# Patient Record
Sex: FEMALE | Race: WHITE | NOT HISPANIC OR LATINO | Employment: OTHER | URBAN - METROPOLITAN AREA
[De-identification: names, ages, dates, MRNs, and addresses within clinical notes are randomized per-mention and may not be internally consistent; named-entity substitution may affect disease eponyms.]

---

## 2017-04-03 DIAGNOSIS — Z12.31 ENCOUNTER FOR SCREENING MAMMOGRAM FOR MALIGNANT NEOPLASM OF BREAST: ICD-10-CM

## 2017-04-13 ENCOUNTER — ALLSCRIPTS OFFICE VISIT (OUTPATIENT)
Dept: OTHER | Facility: OTHER | Age: 47
End: 2017-04-13

## 2017-04-13 ENCOUNTER — LAB REQUISITION (OUTPATIENT)
Dept: LAB | Facility: HOSPITAL | Age: 47
End: 2017-04-13
Payer: COMMERCIAL

## 2017-04-13 DIAGNOSIS — Z01.419 ENCOUNTER FOR GYNECOLOGICAL EXAMINATION WITHOUT ABNORMAL FINDING: ICD-10-CM

## 2017-04-13 PROCEDURE — G0145 SCR C/V CYTO,THINLAYER,RESCR: HCPCS | Performed by: OBSTETRICS & GYNECOLOGY

## 2017-04-21 LAB
LAB AP GYN PRIMARY INTERPRETATION: NORMAL
LAB AP LMP: NORMAL
Lab: NORMAL

## 2017-05-17 ENCOUNTER — HOSPITAL ENCOUNTER (OUTPATIENT)
Dept: MAMMOGRAPHY | Facility: MEDICAL CENTER | Age: 47
Discharge: HOME | End: 2017-05-17
Payer: COMMERCIAL

## 2017-05-17 DIAGNOSIS — Z12.31 ENCOUNTER FOR SCREENING MAMMOGRAM FOR MALIGNANT NEOPLASM OF BREAST: ICD-10-CM

## 2017-05-17 PROCEDURE — G0202 SCR MAMMO BI INCL CAD: HCPCS

## 2018-01-13 VITALS
BODY MASS INDEX: 34.41 KG/M2 | WEIGHT: 187 LBS | SYSTOLIC BLOOD PRESSURE: 108 MMHG | DIASTOLIC BLOOD PRESSURE: 82 MMHG | HEIGHT: 62 IN

## 2018-06-14 ENCOUNTER — ANNUAL EXAM (OUTPATIENT)
Dept: GYNECOLOGY | Facility: CLINIC | Age: 48
End: 2018-06-14

## 2018-06-14 VITALS
DIASTOLIC BLOOD PRESSURE: 78 MMHG | BODY MASS INDEX: 33.27 KG/M2 | WEIGHT: 180.8 LBS | SYSTOLIC BLOOD PRESSURE: 108 MMHG | HEIGHT: 62 IN

## 2018-06-14 DIAGNOSIS — Z01.419 ENCOUNTER FOR GYNECOLOGICAL EXAMINATION WITHOUT ABNORMAL FINDING: ICD-10-CM

## 2018-06-14 DIAGNOSIS — Z12.31 ENCOUNTER FOR SCREENING MAMMOGRAM FOR MALIGNANT NEOPLASM OF BREAST: Primary | ICD-10-CM

## 2018-06-14 DIAGNOSIS — N39.3 SUI (STRESS URINARY INCONTINENCE, FEMALE): ICD-10-CM

## 2018-06-14 DIAGNOSIS — Z12.4 ENCOUNTER FOR PAPANICOLAOU SMEAR FOR CERVICAL CANCER SCREENING: ICD-10-CM

## 2018-06-14 PROCEDURE — 99396 PREV VISIT EST AGE 40-64: CPT | Performed by: OBSTETRICS & GYNECOLOGY

## 2018-06-14 RX ORDER — LORAZEPAM 2 MG/1
TABLET ORAL
COMMUNITY

## 2018-06-14 RX ORDER — ASCORBIC ACID 100 MG
TABLET,CHEWABLE ORAL
COMMUNITY

## 2018-06-14 RX ORDER — MULTIVITAMIN
TABLET ORAL
COMMUNITY

## 2018-06-14 RX ORDER — CITALOPRAM 40 MG/1
TABLET ORAL
COMMUNITY

## 2018-06-14 RX ORDER — ZOLPIDEM TARTRATE 10 MG/1
10 TABLET ORAL DAILY
COMMUNITY
Start: 2018-04-25 | End: 2019-06-25

## 2018-06-14 RX ORDER — BUPROPION HYDROCHLORIDE 300 MG/1
TABLET ORAL
COMMUNITY
Start: 2017-08-15

## 2018-06-14 RX ORDER — TOPIRAMATE 25 MG/1
CAPSULE, COATED PELLETS ORAL
COMMUNITY

## 2018-06-14 NOTE — PROGRESS NOTES
Assessment/Plan:    No problem-specific Assessment & Plan notes found for this encounter  Diagnoses and all orders for this visit:    Encounter for screening mammogram for malignant neoplasm of breast  -     Mammo screening bilateral w 3d & cad; Future    Encounter for gynecological examination without abnormal finding    EMERSON (stress urinary incontinence, female) Recommended urodynamics to r/o ISD  Opts to follow for now    Other orders  -     LORazepam (ATIVAN) 2 mg tablet; Take by mouth  -     B COMPLEX VITAMINS ER PO; Take 1 tablet by mouth  -     buPROPion (WELLBUTRIN XL) 300 mg 24 hr tablet; TAKE 1 TABLET EVERY MORNING  -     Calcium Carb-Cholecalciferol (CALCIUM 1000 + D PO); one a day  -     citalopram (CELEXA) 40 mg tablet; Take by mouth  -     Cholecalciferol (D3-1000) 1000 units capsule; one a day  -     iloperidone (FANAPT) 8 MG; Take 8 mg by mouth  -     DOCOSAHEXAENOIC ACID PO; Take 1 capsule by mouth  -     Multiple Vitamin (MULTI-VITAMIN DAILY) TABS; Take by mouth  -     topiramate (TOPAMAX) 25 mg sprinkle capsule; Take by mouth    -     Ascorbic Acid (VITAMIN C) 100 MG CHEW; Chew  -     zolpidem (AMBIEN) 10 mg tablet; Take 10 mg by mouth daily        Subjective:      Patient ID: Andreia De Leon is a 52 y o  female  HPI   C/O mild recurrent EMERSON  S/P sling 2015  No dysuria, hematuria,urgency or UI     The following portions of the patient's history were reviewed and updated as appropriate: allergies, current medications, past family history, past medical history, past social history, past surgical history and problem list     Review of Systems   Constitutional: Negative  Gastrointestinal: Negative  Genitourinary: Negative  Objective:      /78 (BP Location: Left arm)   Ht 5' 1 5" (1 562 m)   Wt 82 kg (180 lb 12 8 oz)   LMP 06/01/2018 (Exact Date)   BMI 33 61 kg/m²          Physical Exam   Constitutional: She appears well-developed and well-nourished     Neck: Normal range of motion  Neck supple  No thyromegaly present  Cardiovascular: Normal rate, regular rhythm and normal heart sounds  Pulmonary/Chest: Effort normal and breath sounds normal  Right breast exhibits no inverted nipple, no mass, no nipple discharge, no skin change and no tenderness  Left breast exhibits no inverted nipple, no mass, no nipple discharge, no skin change and no tenderness  Abdominal: Soft  Bowel sounds are normal  She exhibits no distension and no mass  There is no tenderness  Hernia confirmed negative in the right inguinal area and confirmed negative in the left inguinal area  Genitourinary: There is no rash or lesion on the right labia  There is no rash or lesion on the left labia  Uterus is not deviated, not enlarged, not fixed and not tender  Cervix exhibits no motion tenderness, no discharge and no friability  Right adnexum displays no mass, no tenderness and no fullness  Left adnexum displays no mass, no tenderness and no fullness  No erythema or bleeding in the vagina  No vaginal discharge found  Genitourinary Comments: Negative urethral hypermobility   Lymphadenopathy:        Right: No inguinal adenopathy present  Left: No inguinal adenopathy present

## 2018-06-25 LAB
CLINICAL INFO: ABNORMAL
CYTO CVX: ABNORMAL
CYTOLOGY CMNT CVX/VAG CYTO-IMP: ABNORMAL
DATE PREVIOUS BX: ABNORMAL
GEN CATEG CVX/VAG CYTO-IMP: ABNORMAL
HPV E6+E7 MRNA CVX QL NAA+PROBE: NOT DETECTED
LMP START DATE: ABNORMAL
QUESTION/PROBLEM: NORMAL
SL AMB CONTAINER TYPE: NORMAL
SL AMB FINAL RESOLUTION: NORMAL
SL AMB PREV. PAP:: ABNORMAL
SL AMB REPORT STATUS: NORMAL
SPECIMEN SOURCE CVX/VAG CYTO: ABNORMAL

## 2019-06-25 ENCOUNTER — ANNUAL EXAM (OUTPATIENT)
Dept: GYNECOLOGY | Facility: CLINIC | Age: 49
End: 2019-06-25

## 2019-06-25 VITALS
HEIGHT: 63 IN | BODY MASS INDEX: 26.33 KG/M2 | DIASTOLIC BLOOD PRESSURE: 74 MMHG | WEIGHT: 148.6 LBS | SYSTOLIC BLOOD PRESSURE: 122 MMHG | HEART RATE: 65 BPM

## 2019-06-25 DIAGNOSIS — N39.3 SUI (STRESS URINARY INCONTINENCE, FEMALE): ICD-10-CM

## 2019-06-25 DIAGNOSIS — Z12.31 ENCOUNTER FOR SCREENING MAMMOGRAM FOR MALIGNANT NEOPLASM OF BREAST: Primary | ICD-10-CM

## 2019-06-25 DIAGNOSIS — Z01.419 ENCOUNTER FOR GYNECOLOGICAL EXAMINATION WITHOUT ABNORMAL FINDING: ICD-10-CM

## 2019-06-25 PROCEDURE — 99396 PREV VISIT EST AGE 40-64: CPT | Performed by: OBSTETRICS & GYNECOLOGY

## 2019-07-31 ENCOUNTER — OFFICE VISIT (OUTPATIENT)
Dept: GYNECOLOGY | Facility: CLINIC | Age: 49
End: 2019-07-31

## 2019-07-31 VITALS
BODY MASS INDEX: 26.26 KG/M2 | HEIGHT: 63 IN | SYSTOLIC BLOOD PRESSURE: 116 MMHG | WEIGHT: 148.2 LBS | DIASTOLIC BLOOD PRESSURE: 76 MMHG | HEART RATE: 65 BPM

## 2019-07-31 DIAGNOSIS — N39.41 URGENCY INCONTINENCE: Primary | ICD-10-CM

## 2019-07-31 DIAGNOSIS — N39.3 SUI (STRESS URINARY INCONTINENCE, FEMALE): ICD-10-CM

## 2019-07-31 PROCEDURE — 99213 OFFICE O/P EST LOW 20 MIN: CPT | Performed by: OBSTETRICS & GYNECOLOGY

## 2019-07-31 RX ORDER — SOLIFENACIN SUCCINATE 5 MG/1
5 TABLET, FILM COATED ORAL DAILY
Qty: 90 TABLET | Refills: 3 | Status: SHIPPED | OUTPATIENT
Start: 2019-07-31 | End: 2020-07-14 | Stop reason: SDUPTHER

## 2019-07-31 NOTE — PROGRESS NOTES
Assessment/Plan:         Diagnoses and all orders for this visit:    EMERSON (stress urinary incontinence, female); recommended urodynamics to rule out ISD  Urgency incontinence; will begin VESIcare 5 mg daily  Subjective:      Patient ID: Sweta Moss is a 50 y o  female  HPI    patient presents to the office today complaining of urinary incontinence  This occurs with exercise  She also has issues of urgency and urgency incontinence  She denies any dysuria, hematuria or change in frequency of urination or nocturia  She has had a prior mid urethral sling placed in   She denies any vaginal discharge or bleeding or abdominal pain  She has no difficulty with bowel movements  Denies any vaginal pressure or pain  The following portions of the patient's history were reviewed and updated as appropriate:   She  has a past medical history of Bipolar disorder ([facility] [facility] ), Panic attacks, and Suicide attempt ([facility] )  She There are no active problems to display for this patient  She  has a past surgical history that includes Bladder suspension; Tubal ligation;  section; and Cholecystectomy  Her family history includes Diabetes in her maternal grandmother; Heart disease in her maternal grandfather  She  reports that she has never smoked  She has never used smokeless tobacco  She reports that she drinks alcohol  She reports that she does not use drugs    Current Outpatient Medications   Medication Sig Dispense Refill    Ascorbic Acid (VITAMIN C) 100 MG CHEW Chew      buPROPion (WELLBUTRIN XL) 300 mg 24 hr tablet TAKE 1 TABLET EVERY MORNING      citalopram (CELEXA) 40 mg tablet Take by mouth      iloperidone (FANAPT) 8 MG Take 8 mg by mouth      LORazepam (ATIVAN) 2 mg tablet Take by mouth      Multiple Vitamin (MULTI-VITAMIN DAILY) TABS Take by mouth      topiramate (TOPAMAX) 25 mg sprinkle capsule Take by mouth        B COMPLEX VITAMINS ER PO Take 1 tablet by mouth      Calcium Carb-Cholecalciferol (CALCIUM 1000 + D PO) one a day      DOCOSAHEXAENOIC ACID PO Take 1 capsule by mouth       No current facility-administered medications for this visit  Current Outpatient Medications on File Prior to Visit   Medication Sig    Ascorbic Acid (VITAMIN C) 100 MG CHEW Chew    buPROPion (WELLBUTRIN XL) 300 mg 24 hr tablet TAKE 1 TABLET EVERY MORNING    citalopram (CELEXA) 40 mg tablet Take by mouth    iloperidone (FANAPT) 8 MG Take 8 mg by mouth    LORazepam (ATIVAN) 2 mg tablet Take by mouth    Multiple Vitamin (MULTI-VITAMIN DAILY) TABS Take by mouth    topiramate (TOPAMAX) 25 mg sprinkle capsule Take by mouth      B COMPLEX VITAMINS ER PO Take 1 tablet by mouth    Calcium Carb-Cholecalciferol (CALCIUM 1000 + D PO) one a day    DOCOSAHEXAENOIC ACID PO Take 1 capsule by mouth     No current facility-administered medications on file prior to visit  She has No Known Allergies       Review of Systems   Gastrointestinal: Negative  Genitourinary:        See HPI         Objective:      /76 (BP Location: Left arm)   Pulse 65   Ht 5' 2 5" (1 588 m)   Wt 67 2 kg (148 lb 3 2 oz)   BMI 26 67 kg/m²          Physical Exam   Constitutional: She appears well-nourished  Abdominal: Soft  Bowel sounds are normal  She exhibits no distension and no mass  There is no tenderness  There is no rebound and no guarding  Genitourinary:   Genitourinary Comments: Vulva appears normal    Vagina:  Normal     Uterus is anteverted normal size and contour freely mobile nontender  There are no palpable adnexal masses or tenderness  No urethral hypermobility

## 2019-11-13 DIAGNOSIS — Z12.31 ENCOUNTER FOR SCREENING MAMMOGRAM FOR MALIGNANT NEOPLASM OF BREAST: ICD-10-CM

## 2020-06-30 ENCOUNTER — TELEPHONE (OUTPATIENT)
Dept: OTHER | Facility: OTHER | Age: 50
End: 2020-06-30

## 2020-07-14 ENCOUNTER — ANNUAL EXAM (OUTPATIENT)
Dept: GYNECOLOGY | Facility: CLINIC | Age: 50
End: 2020-07-14

## 2020-07-14 VITALS
HEIGHT: 62 IN | DIASTOLIC BLOOD PRESSURE: 76 MMHG | SYSTOLIC BLOOD PRESSURE: 116 MMHG | HEART RATE: 65 BPM | WEIGHT: 169 LBS | BODY MASS INDEX: 31.1 KG/M2

## 2020-07-14 DIAGNOSIS — N93.9 ABNORMAL UTERINE BLEEDING (AUB): Primary | ICD-10-CM

## 2020-07-14 DIAGNOSIS — Z01.419 ENCOUNTER FOR GYNECOLOGICAL EXAMINATION WITH PAPANICOLAOU SMEAR OF CERVIX: ICD-10-CM

## 2020-07-14 DIAGNOSIS — Z12.31 ENCOUNTER FOR SCREENING MAMMOGRAM FOR MALIGNANT NEOPLASM OF BREAST: ICD-10-CM

## 2020-07-14 PROCEDURE — G0145 SCR C/V CYTO,THINLAYER,RESCR: HCPCS | Performed by: OBSTETRICS & GYNECOLOGY

## 2020-07-14 PROCEDURE — 99396 PREV VISIT EST AGE 40-64: CPT | Performed by: OBSTETRICS & GYNECOLOGY

## 2020-07-14 RX ORDER — MEDROXYPROGESTERONE ACETATE 10 MG/1
10 TABLET ORAL DAILY
Qty: 10 TABLET | Refills: 0 | Status: SHIPPED | OUTPATIENT
Start: 2020-07-14 | End: 2021-03-02

## 2020-07-21 LAB
LAB AP GYN PRIMARY INTERPRETATION: NORMAL
Lab: NORMAL

## 2020-08-05 ENCOUNTER — ULTRASOUND (OUTPATIENT)
Dept: GYNECOLOGY | Facility: CLINIC | Age: 50
End: 2020-08-05

## 2020-08-05 ENCOUNTER — OFFICE VISIT (OUTPATIENT)
Dept: GYNECOLOGY | Facility: CLINIC | Age: 50
End: 2020-08-05

## 2020-08-05 DIAGNOSIS — N93.9 ABNORMAL UTERINE BLEEDING (AUB): Primary | ICD-10-CM

## 2020-08-05 DIAGNOSIS — N80.0 ADENOMYOSIS: ICD-10-CM

## 2020-08-05 DIAGNOSIS — N84.0 ENDOMETRIAL POLYP: ICD-10-CM

## 2020-08-05 PROCEDURE — 58100 BIOPSY OF UTERUS LINING: CPT | Performed by: OBSTETRICS & GYNECOLOGY

## 2020-08-05 PROCEDURE — 76831 ECHO EXAM UTERUS: CPT | Performed by: OBSTETRICS & GYNECOLOGY

## 2020-08-05 PROCEDURE — 88305 TISSUE EXAM BY PATHOLOGIST: CPT | Performed by: PATHOLOGY

## 2020-08-05 PROCEDURE — 99212 OFFICE O/P EST SF 10 MIN: CPT | Performed by: OBSTETRICS & GYNECOLOGY

## 2020-08-05 PROCEDURE — 58340 CATHETER FOR HYSTEROGRAPHY: CPT | Performed by: OBSTETRICS & GYNECOLOGY

## 2020-08-05 PROCEDURE — 76830 TRANSVAGINAL US NON-OB: CPT | Performed by: OBSTETRICS & GYNECOLOGY

## 2020-08-05 NOTE — PROGRESS NOTES
AMB US Pelvic Non OB    Date/Time: 8/5/2020 8:15 AM  Performed by: Michael Blum  Authorized by: Carly Matt DO     Procedure details:     Technique:  Transvaginal US, Non-OB    Position: lithotomy exam    Uterine findings:     Length (cm): 7 48    Height (cm):  5 07    Width (cm):  5 71    Endometrial stripe: identified      Endometrium thickness (mm):  11 6  Left ovary findings:     Left ovary:  Visualized    Length (cm): 3 23    Height (cm): 1 77    Width (cm): 1 71  Right ovary findings:     Right ovary:  Visualized    Length (cm): 2 18    Height (cm): 2    Width (cm): 2 27  Other findings:     Free pelvic fluid: identified    Post-Procedure Details:     Impression:  Multi-position uterus is inhomogeneous throughout without fibroids suggestive of adenomyosis  The bilateral ovaries appear within normal limits  The right ovary contains a 1 8GZ follicle  Small amount of free fluid is noted within the cul de sac  Tolerance: Tolerated well, no immediate complications    Complications: no complications    Additional Procedure Comments:      AutoAlert P5 transvaginal transducer E8C with Serial Number [id number] was used during procedure and subsequently cleaned with high level disinfection utilizing the [employer]       Ultrasound performed at:     [facility] for [facility] [address]  [address] [facility]  [address], [address] [facility]  Phone: [phone]  Fax:  [phone]    Sonohysterogram    Date/Time: 8/5/2020 8:16 AM  Performed by: Carly Matt DO  Authorized by: Carly Matt DO     Consent:     Consent obtained:  Verbal and written    Consent given by:  Patient    Patient questions answered: yes      Patient agrees, verbalizes understanding, and wants to proceed: yes      Instructions and paperwork completed: yes    Pre-procedure:     Pre-procedure timeout performed: yes      Prepped with: Betadine    Procedure:     Cervix cleaned and prepped: yes Catheter inserted: yes      Uterine cavity distended with saline: yes    Post-procedure:     Patient observed: yes      Post procedure instructions given to patient: yes      Patient tolerated procedure well with no complications: yes    Comments:      Sonohysterogram demonstrates a polyp within the endometrium     Endometrial biopsy    Date/Time: 8/5/2020 8:16 AM  Performed by: Vandana Jovel DO  Authorized by: Vandana Jovel DO     Consent:     Consent obtained:  Verbal and written    Consent given by:  Patient    Patient questions answered: yes      Patient agrees, verbalizes understanding, and wants to proceed: yes      Instructions and paperwork completed: yes    Indication:     Indications: Post-menopausal bleeding    Pre-procedure:     Pre-procedure timeout performed: yes    Procedure:     Procedure: endometrial biopsy with Pipelle      A bivalve speculum was placed in the vagina: yes      Cervix cleaned and prepped: yes      Specimen collected: specimen collected and sent to pathology      Patient tolerated procedure well with no complications: yes

## 2020-08-05 NOTE — PROGRESS NOTES
Patient presents to the office for transvaginal scan possible SIS possible endometrial biopsy secondary to abnormal uterine bleeding  Procedure Orders    1  Endometrial biopsy [[id number]] ordered by Chastity Anton    2  Sonohysterogram [[id number]] ordered by Chastity Anton    3  AMB US Pelvic Non OB [[id number]] ordered by Chastity Anton    Post-procedure Diagnoses    1  Abnormal uterine bleeding (AUB) [N93 9]       AMB US Pelvic Non OB   Date/Time: 8/5/2020 8:15 AM  Performed by: Chastity Anton  Authorized by: Carlyle Arias DO      Procedure details:     Technique:  Transvaginal US, Non-OB    Position: lithotomy exam    Uterine findings:     Length (cm): 7 48    Height (cm):  5 07    Width (cm):  5 71    Endometrial stripe: identified      Endometrium thickness (mm):  11 6  Left ovary findings:     Left ovary:  Visualized    Length (cm): 3 23    Height (cm): 1 77    Width (cm): 1 71  Right ovary findings:     Right ovary:  Visualized    Length (cm): 2 18    Height (cm): 2    Width (cm): 2 27  Other findings:     Free pelvic fluid: identified    Post-Procedure Details:     Impression:  Multi-position uterus is inhomogeneous throughout without fibroids suggestive of adenomyosis  The bilateral ovaries appear within normal limits  The right ovary contains a 5 4QR follicle  Small amount of free fluid is noted within the cul de sac  Tolerance:   Tolerated well, no immediate complications    Complications: no complications    Additional Procedure Comments:      [employer] P5 transvaginal transducer E8C with Serial Number [id number] was used during procedure and subsequently cleaned with high level disinfection utilizing the [employer]       Ultrasound performed at:   [address], [address] [facility]  Phone: [phone]  Fax:  [phone]     Sonohysterogram   Date/Time: 8/5/2020 8:16 AM  Performed by: Kassandra Dee DO Huber  Authorized by: Cintia Fournier DO      Consent:     Consent obtained:  Verbal and written    Consent given by:  Patient    Patient questions answered: yes      Patient agrees, verbalizes understanding, and wants to proceed: yes      Instructions and paperwork completed: yes    Pre-procedure:     Pre-procedure timeout performed: yes      Prepped with: Betadine    Procedure:     Cervix cleaned and prepped: yes      Catheter inserted: yes      Uterine cavity distended with saline: yes    Post-procedure:     Patient observed: yes      Post procedure instructions given to patient: yes      Patient tolerated procedure well with no complications: yes    Comments:      Sonohysterogram demonstrates a polyp within the endometrium  Endometrial biopsy   Date/Time: 8/5/2020 8:16 AM  Performed by: Cintia Fournier DO  Authorized by: Cintia Fournier DO      Consent:     Consent obtained:  Verbal and written    Consent given by:  Patient    Patient questions answered: yes      Patient agrees, verbalizes understanding, and wants to proceed: yes      Instructions and paperwork completed: yes    Indication:     Indications: Post-menopausal bleeding    Pre-procedure:     Pre-procedure timeout performed: yes    Procedure:     Procedure: endometrial biopsy with Pipelle      A bivalve speculum was placed in the vagina: yes      Cervix cleaned and prepped: yes      Specimen collected: specimen collected and sent to pathology      Patient tolerated procedure well with no complications: yes          Impression:  Abnormal uterine bleeding/adenomyosis/endometrial polyp  Plan: Will return to the office for hysteroscopy D&C polypectomy  Procedure along with risks were discussed  Also discussed adenomyosis and treatment options  If she continues to have menorrhagia after removal of the polyp would consider medical management for adenomyosis

## 2020-08-12 ENCOUNTER — DOCUMENTATION (OUTPATIENT)
Dept: GYNECOLOGY | Facility: CLINIC | Age: 50
End: 2020-08-12

## 2020-08-12 NOTE — PROGRESS NOTES
PT   HAD APPT   FOR OFFICE SURGERY ON 9/4  INFORMED ME SHE IS ATTENDING A WED[place]ING IN [place],THAT IS A HOT STATE WILL NEED TO QUARANTINE FOR 14 DAYS  RESCHEDULED HER APPT TO 10/2

## 2020-10-15 ENCOUNTER — TELEPHONE (OUTPATIENT)
Dept: GYNECOLOGY | Facility: CLINIC | Age: 50
End: 2020-10-15

## 2020-12-10 ENCOUNTER — TELEPHONE (OUTPATIENT)
Dept: GYNECOLOGY | Facility: CLINIC | Age: 50
End: 2020-12-10

## 2021-01-08 ENCOUNTER — PROCEDURE VISIT (OUTPATIENT)
Dept: GYNECOLOGY | Facility: CLINIC | Age: 51
End: 2021-01-08
Payer: COMMERCIAL

## 2021-01-08 VITALS — SYSTOLIC BLOOD PRESSURE: 134 MMHG | DIASTOLIC BLOOD PRESSURE: 95 MMHG | HEART RATE: 104 BPM

## 2021-01-08 DIAGNOSIS — N84.0 ENDOMETRIAL POLYP: ICD-10-CM

## 2021-01-08 DIAGNOSIS — N93.9 ABNORMAL UTERINE BLEEDING (AUB): Primary | ICD-10-CM

## 2021-01-08 PROCEDURE — 88305 TISSUE EXAM BY PATHOLOGIST: CPT | Performed by: PATHOLOGY

## 2021-01-08 PROCEDURE — 88342 IMHCHEM/IMCYTCHM 1ST ANTB: CPT | Performed by: PATHOLOGY

## 2021-01-08 PROCEDURE — 58558 HYSTEROSCOPY BIOPSY: CPT | Performed by: OBSTETRICS & GYNECOLOGY

## 2021-03-01 ENCOUNTER — DOCUMENTATION (OUTPATIENT)
Dept: GYNECOLOGY | Facility: CLINIC | Age: 51
End: 2021-03-01

## 2021-03-01 NOTE — PROGRESS NOTES
Pt called she had period during her D&C polypectomy on 1/8  Got a period late in Feb 2/17 it was normal  Now she is bleeding again 3/1, 2 weeks after last period  Please advise

## 2021-03-02 DIAGNOSIS — N93.9 ABNORMAL UTERINE BLEEDING (AUB): Primary | ICD-10-CM

## 2021-03-02 RX ORDER — MEDROXYPROGESTERONE ACETATE 10 MG/1
10 TABLET ORAL DAILY
Qty: 10 TABLET | Refills: 0 | Status: SHIPPED | OUTPATIENT
Start: 2021-03-02 | End: 2021-03-12

## 2021-03-03 ENCOUNTER — DOCUMENTATION (OUTPATIENT)
Dept: GYNECOLOGY | Facility: CLINIC | Age: 51
End: 2021-03-03

## 2021-03-03 NOTE — PROGRESS NOTES
Called pt , Dr Rina Álvarez called in Provera for her to take for 10 days to shed the lining for hger    Pt will call if no better

## 2021-07-07 ENCOUNTER — TELEPHONE (OUTPATIENT)
Dept: GYNECOLOGY | Facility: CLINIC | Age: 51
End: 2021-07-07

## 2021-07-07 NOTE — TELEPHONE ENCOUNTER
Patient called looking for date of bladder sling surgery  I could not find it in her chart  Please call her with date

## 2022-03-15 ENCOUNTER — OFFICE VISIT (OUTPATIENT)
Dept: GYNECOLOGY | Facility: CLINIC | Age: 52
End: 2022-03-15
Payer: COMMERCIAL

## 2022-03-15 VITALS
SYSTOLIC BLOOD PRESSURE: 104 MMHG | DIASTOLIC BLOOD PRESSURE: 64 MMHG | WEIGHT: 176.8 LBS | HEART RATE: 77 BPM | BODY MASS INDEX: 32.87 KG/M2

## 2022-03-15 DIAGNOSIS — R10.31 RIGHT LOWER QUADRANT ABDOMINAL PAIN: Primary | ICD-10-CM

## 2022-03-15 PROCEDURE — 99213 OFFICE O/P EST LOW 20 MIN: CPT | Performed by: OBSTETRICS & GYNECOLOGY

## 2022-03-15 RX ORDER — NAPROXEN SODIUM 550 MG/1
550 TABLET ORAL 2 TIMES DAILY WITH MEALS
Qty: 30 TABLET | Refills: 0 | Status: SHIPPED | OUTPATIENT
Start: 2022-03-15 | End: 2022-03-30

## 2022-03-15 NOTE — PROGRESS NOTES
Assessment/Plan:         Diagnoses and all orders for this visit:    Right lower quadrant abdominal pain; possible ruptured ovarian cyst   Return to the office for transvaginal scan  Subjective:      Patient ID: Alek Neff is a 46 y o  female  HPI  patient presents to the office complaining of right lower quadrant pain she had abrupt onset of pain yesterday in the right lower quadrant  The pain has persisted since then  She has no associated nausea, vomiting, diarrhea, fever or constipation  Denies any dysuria, hematuria urgency or urinary incontinence  She is due for menses any day  She is status post hysteroscopy D&C polypectomy in 2021  Since then her menses have been regular with normal flow  She describes the pain as a 7/10 and persistent  No relief with Tylenol she did have coitus this morning and that aggravated the discomfort  The following portions of the patient's history were reviewed and updated as appropriate:   She  has a past medical history of Bipolar disorder ([facility] [address] ), Panic attacks, and [facility] )  She There are no problems to display for this patient  She  has a past surgical history that includes Bladder suspension; Tubal ligation;  section; Cholecystectomy; and DILATION AND CURETTAGE OF UTERUS WITH HYSTEROSOCPY (N/A, 2021)  Her family history includes Diabetes in her maternal grandmother; Heart disease in her maternal grandfather  She  reports that she has never smoked  She has never used smokeless tobacco  She reports previous alcohol use  She reports that she does not use drugs    Current Outpatient Medications   Medication Sig Dispense Refill    Ascorbic Acid (VITAMIN C) 100 MG CHEW Chew      B COMPLEX VITAMINS ER PO Take 1 tablet by mouth      buPROPion (WELLBUTRIN XL) 300 mg 24 hr tablet TAKE 1 TABLET EVERY MORNING      Calcium Carb-Cholecalciferol (CALCIUM 1000 + D PO) one a day      citalopram (CELEXA) 40 mg tablet Take by mouth      iloperidone (FANAPT) 8 MG Take 8 mg by mouth      LORazepam (ATIVAN) 2 mg tablet Take by mouth      Multiple Vitamin (MULTI-VITAMIN DAILY) TABS Take by mouth      topiramate (TOPAMAX) 25 mg sprinkle capsule Take by mouth        DOCOSAHEXAENOIC ACID PO Take 1 capsule by mouth (Patient not taking: Reported on 3/15/2022 )      medroxyPROGESTERone (PROVERA) 10 mg tablet Take 1 tablet (10 mg total) by mouth daily for 10 days (Patient not taking: Reported on 3/15/2022 ) 10 tablet 0    Sodium Sulfate-Mag Sulfate-KCl (Sutab) 9640-906-504 MG TABS Take 1 kit by mouth see administration instructions Instructions given office visit (Patient not taking: Reported on 3/15/2022 ) 24 tablet 0     No current facility-administered medications for this visit  Current Outpatient Medications on File Prior to Visit   Medication Sig    Ascorbic Acid (VITAMIN C) 100 MG CHEW Chew    B COMPLEX VITAMINS ER PO Take 1 tablet by mouth    buPROPion (WELLBUTRIN XL) 300 mg 24 hr tablet TAKE 1 TABLET EVERY MORNING    Calcium Carb-Cholecalciferol (CALCIUM 1000 + D PO) one a day    citalopram (CELEXA) 40 mg tablet Take by mouth    iloperidone (FANAPT) 8 MG Take 8 mg by mouth    LORazepam (ATIVAN) 2 mg tablet Take by mouth    Multiple Vitamin (MULTI-VITAMIN DAILY) TABS Take by mouth    topiramate (TOPAMAX) 25 mg sprinkle capsule Take by mouth      DOCOSAHEXAENOIC ACID PO Take 1 capsule by mouth (Patient not taking: Reported on 3/15/2022 )    medroxyPROGESTERone (PROVERA) 10 mg tablet Take 1 tablet (10 mg total) by mouth daily for 10 days (Patient not taking: Reported on 3/15/2022 )    Sodium Sulfate-Mag Sulfate-KCl (Sutab) 6841-980-687 MG TABS Take 1 kit by mouth see administration instructions Instructions given office visit (Patient not taking: Reported on 3/15/2022 )     No current facility-administered medications on file prior to visit  She has No Known Allergies       Review of Systems Gastrointestinal: Positive for abdominal pain  Negative for abdominal distention, blood in stool, constipation, diarrhea, nausea and vomiting  Genitourinary: Negative  Objective:      /64   Pulse 77   Wt 80 2 kg (176 lb 12 8 oz)   LMP 02/08/2022   BMI 32 87 kg/m²          Physical Exam  Vitals reviewed  Abdominal:      General: There is no distension  Palpations: Abdomen is soft  There is no mass  Tenderness: There is abdominal tenderness (Slight tenderness)  There is no guarding or rebound  Hernia: No hernia is present  There is no hernia in the left inguinal area or right inguinal area  Genitourinary:     General: Normal vulva  Labia:         Right: No rash, tenderness or lesion  Left: No rash, tenderness or lesion  Vagina: Normal       Cervix: Normal       Uterus: Normal        Adnexa:         Right: Tenderness and fullness present  No mass  Left: No mass, tenderness or fullness  Lymphadenopathy:      Lower Body: No right inguinal adenopathy  No left inguinal adenopathy  Neurological:      Mental Status: She is alert

## 2022-03-16 ENCOUNTER — OFFICE VISIT (OUTPATIENT)
Dept: GYNECOLOGY | Facility: CLINIC | Age: 52
End: 2022-03-16
Payer: COMMERCIAL

## 2022-03-16 ENCOUNTER — ULTRASOUND (OUTPATIENT)
Dept: GYNECOLOGY | Facility: CLINIC | Age: 52
End: 2022-03-16
Payer: COMMERCIAL

## 2022-03-16 DIAGNOSIS — R10.31 RLQ ABDOMINAL PAIN: Primary | ICD-10-CM

## 2022-03-16 DIAGNOSIS — R10.31 RIGHT LOWER QUADRANT ABDOMINAL PAIN: Primary | ICD-10-CM

## 2022-03-16 PROCEDURE — 99212 OFFICE O/P EST SF 10 MIN: CPT | Performed by: OBSTETRICS & GYNECOLOGY

## 2022-03-16 PROCEDURE — 76830 TRANSVAGINAL US NON-OB: CPT | Performed by: OBSTETRICS & GYNECOLOGY

## 2022-03-16 NOTE — PROGRESS NOTES
AMB US Pelvic Non OB    Date/Time: 3/16/2022 6:56 AM  Performed by: Seth Fraser  Authorized by: Ally Meza DO   Universal Protocol:  Patient identity confirmed: verbally with patient      Procedure details:     Technique:  Transvaginal US, Non-OB    Position: lithotomy exam    Uterine findings:     Length (cm): 6 83    Height (cm):  4 19    Width (cm):  5 8    Endometrial stripe: identified      Endometrium thickness (mm):  4 5  Left ovary findings:     Left ovary:  Visualized    Length (cm): 2 48    Height (cm): 1 17    Width (cm): 1 73  Right ovary findings:     Right ovary:  Visualized    Length (cm): 2 48    Height (cm): 1 37    Width (cm): 1 46  Other findings:     Free pelvic fluid: not identified      Free peritoneal fluid: not identified    Post-Procedure Details:     Impression:  Anteverted uterus is inhomogeneous throughout without fibroids  Otherwise, the uterus and bilateral ovaries appear within normal limits  No free fluid  Tolerance: Tolerated well, no immediate complications    Complications: no complications    Additional Procedure Comments:      [employer] P5 transvaginal transducer E8C with Serial Number [id number] was used during procedure and subsequently cleaned with high level disinfection utilizing the [employer]       Ultrasound performed at:     [facility] for [address]  [address], [address]   Phone: [phone]  Fax:  [phone]

## 2022-03-16 NOTE — PROGRESS NOTES
Note from yesterday:   patient presents to the office complaining of right lower quadrant pain she had abrupt onset of pain yesterday in the right lower quadrant  The pain has persisted since then  She has no associated nausea, vomiting, diarrhea, fever or constipation  Denies any dysuria, hematuria urgency or urinary incontinence  She is due for menses any day  She is status post hysteroscopy D&C polypectomy in January of 2021  Since then her menses have been regular with normal flow  She describes the pain as a 7/10 and persistent  No relief with Tylenol she did have coitus this morning and that aggravated the discomfort  Advised to return to the office for transvaginal scan  Since seen yesterday the pain has subsided with naproxen  TVS:       Date/Time: 3/16/2022 6:56 AM  Performed by: Chio Rivas  Authorized by: Rober Perez DO   Universal Protocol:  Patient identity confirmed: verbally with patient        Procedure details:     Technique:  Transvaginal US, Non-OB    Position: lithotomy exam    Uterine findings:     Length (cm): 6 83    Height (cm):  4 19    Width (cm):  5 8    Endometrial stripe: identified      Endometrium thickness (mm):  4 5  Left ovary findings:     Left ovary:  Visualized    Length (cm): 2 48    Height (cm): 1 17    Width (cm): 1 73  Right ovary findings:     Right ovary:  Visualized    Length (cm): 2 48    Height (cm): 1 37    Width (cm): 1 46  Other findings:     Free pelvic fluid: not identified      Free peritoneal fluid: not identified    Post-Procedure Details:     Impression:  Anteverted uterus is inhomogeneous throughout without fibroids  Otherwise, the uterus and bilateral ovaries appear within normal limits  No free fluid  Tolerance: Tolerated well, no immediate       Impression:  Right lower quadrant abdominal pain/resolved    Plan: Reviewed ultrasound findings with patient    Should pain reoccur she will return to the office

## 2023-05-22 ENCOUNTER — EVALUATION (OUTPATIENT)
Dept: PHYSICAL THERAPY | Facility: MEDICAL CENTER | Age: 53
End: 2023-05-22

## 2023-05-22 DIAGNOSIS — M25.562 LEFT KNEE PAIN, UNSPECIFIED CHRONICITY: Primary | ICD-10-CM

## 2023-05-22 NOTE — PROGRESS NOTES
PT Evaluation     Today's date: 2023  Patient name: Sushma Pteit  : 1970  MRN: [id number]  Referring provider: Jomar Chanel*  Dx:   Encounter Diagnosis     ICD-10-CM    1  Left knee pain, unspecified chronicity  M25 562                      Assessment  Assessment details: Pt is a pleasant 47 yo female presenting to physical therapy with L knee pain  Pt would benefit from skilled PT to address current impairments and return pt to pre-morbid function  Understanding of Dx/Px/POC: good   Prognosis: good    Goals  Impairment Goals  - Pt I with initial HEP in 1-2 visits  - Increase strength to 5/5 in all affected areas in 4-6 weeks    Functional Goals  - Increase FOTO to at least 74 in 6-8 weeks  - Patient will be independent with comprehensive HEP in 6-8 weeks  - Squatting is improved to prior level of function in 6-8 weeks    Plan  Patient would benefit from: skilled physical therapy  Other planned modality interventions: Modalities prn   Planned therapy interventions: manual therapy, neuromuscular re-education, patient education, strengthening, stretching, therapeutic activities, therapeutic exercise and balance  Frequency: 2x week  Duration in weeks: 8  Treatment plan discussed with: patient        Subjective Evaluation    History of Present Illness  Mechanism of injury: Pt reports that she was doing a step up exercise on a folding chair and she missteped on the way down  When her L foot hit the ground she fell to the ground and felt a pop in her L knee  The pop occurred in the back of the knee  She was able to put weight on it, but it hurt  This occurred in March  She has reconverted from the injury with everything except squatting  Pt is pain free unless she squats down for daily activity and with exercise      Pain  Current pain ratin  At best pain ratin  At worst pain rating: 3    Social Support    Working: Home health aide   Exercise history: walking, circuit "training       Diagnostic Tests  X-ray: normal  Abnormal MRI: pending  Treatments  No previous or current treatments  Patient Goals  Patient goals for therapy: decreased pain  Patient goal: restore ability squat, exercise better        Objective     Observations     Additional Observation Details  Gait is unremarkable    Balance is decreased B, worse on the L    - LLD     Functional squat: Pt with good depth, but shifts to the right at end range due to pain            Neurological Testing     Sensation     Knee   Left Knee   Intact: light touch    Right Knee   Intact: light touch     Active Range of Motion   Left Knee   Normal active range of motion    Right Knee   Normal active range of motion    Passive Range of Motion   Left Knee   Normal passive range of motion    Right Knee   Normal passive range of motion    Additional Passive Range of Motion Details  Pt with no pain with MO in flexion on the L     Mobility   Patellar Mobility:   Left Knee   WFL: medial, lateral, superior and inferior       Right Knee   WFL: medial, lateral, superior and inferior    Strength/Myotome Testing     Left Hip   Planes of Motion   Flexion: 4  Extension: 4  Abduction: 4  Adduction: 4    Isolated Muscles   Gluteus saad: 4    Right Hip   Planes of Motion   Flexion: 4  Extension: 4  Abduction: 4  Adduction: 4    Isolated Muscles   Gluteus maximums: 4    Left Knee   Prone flexion: 3+  Extension: 5    Right Knee   Prone flexion: 5  Extension: 5    General Comments:      Knee Comments  + L quad tightness             Precautions: None      Manuals 5/22            Knee flexion mobs  HK                                                   Ther Ex 5/22            Bike NV            Quad str 3x30\"            Standing HS curls 3x15            Bridging x30                                                                                                                                                                                                    "

## 2023-06-12 ENCOUNTER — APPOINTMENT (OUTPATIENT)
Dept: PHYSICAL THERAPY | Facility: MEDICAL CENTER | Age: 53
End: 2023-06-12
Payer: COMMERCIAL

## 2023-06-22 ENCOUNTER — OFFICE VISIT (OUTPATIENT)
Dept: PHYSICAL THERAPY | Facility: MEDICAL CENTER | Age: 53
End: 2023-06-22
Payer: COMMERCIAL

## 2023-06-22 DIAGNOSIS — M54.16 LUMBAR RADICULOPATHY: ICD-10-CM

## 2023-06-22 DIAGNOSIS — M54.12 CERVICAL RADICULOPATHY: Primary | ICD-10-CM

## 2023-06-22 PROCEDURE — 97110 THERAPEUTIC EXERCISES: CPT

## 2023-06-22 PROCEDURE — 97140 MANUAL THERAPY 1/> REGIONS: CPT

## 2023-06-22 NOTE — PROGRESS NOTES
"Daily Note     Today's date: 2023  Patient name: Csasie Vera  : 1970  MRN: [id number]  Referring provider: Mariel Mann*  Dx:   Encounter Diagnosis     ICD-10-CM    1  Cervical radiculopathy  M54 12       2  Lumbar radiculopathy  M54 16                      Subjective: Pt reports that she's been compliant with her hep and her knee has been feeling much better  Objective: See treatment diary below      Assessment: Tolerated treatment well  Patient demonstrated fatigue post treatment and exhibited good technique with therapeutic exercises  Pt has responded well to current tx plan and has been performing her hep on a daily basis  Pt's strength and activity level have improved since beginning her hep and is therefore dc'd from PT  Plan: Pt dc'd to hep after todays visit        Precautions: None      Manuals            Knee flexion mobs  HK PROM  KO                                                  Ther Ex            Bike NV 10 min           Quad str 3x30\" 3x30\"           Standing HS curls 3x15 3x15           Bridging x30 x30           Add squeeze  5\"  3x10           T-band abd  Blk  3x10           Clamshells  3x10           Reverse CS  3x10                                                                                                                                                                                                                                             "

## 2023-08-16 PROBLEM — Z86.010 HX OF ADENOMATOUS POLYP OF COLON: Status: ACTIVE | Noted: 2021-03-17

## 2023-08-16 PROBLEM — Z86.0101 HX OF ADENOMATOUS POLYP OF COLON: Status: ACTIVE | Noted: 2021-03-17

## 2023-08-16 PROBLEM — Z98.890 HISTORY OF ESOPHAGOGASTRODUODENOSCOPY (EGD): Status: ACTIVE | Noted: 2023-08-01

## 2023-09-14 ENCOUNTER — EVALUATION (OUTPATIENT)
Dept: PHYSICAL THERAPY | Facility: MEDICAL CENTER | Age: 53
End: 2023-09-14
Payer: COMMERCIAL

## 2023-09-14 DIAGNOSIS — M25.562 ACUTE PAIN OF LEFT KNEE: Primary | ICD-10-CM

## 2023-09-14 PROCEDURE — 97110 THERAPEUTIC EXERCISES: CPT | Performed by: PHYSICAL THERAPIST

## 2023-09-14 PROCEDURE — 97161 PT EVAL LOW COMPLEX 20 MIN: CPT | Performed by: PHYSICAL THERAPIST

## 2023-09-14 NOTE — PROGRESS NOTES
PT Evaluation     Today's date: 2023  Patient name: Apple Sharma  : 1970  MRN: [id number]  Referring provider: Symone Stovall*  Dx:   Encounter Diagnosis     ICD-10-CM    1. Acute pain of left knee  M25.562                      Assessment  Assessment details: Apple Sharma is a pleasant 48 y.o. female who presents with acute L knee pain since injury sustained when falling on 23. MRI of her knee showed a L MCL sprain and patellar cartilage tear. She also has a known L ACL tear from May 2023 that was treated conservatively. No further referral is necessary based upon examination results. Primary movement impairment is L knee hypomobility as a result of injury sustained 3 weeks ago (DOI: 23), which limits her ability to ambulate and negotiate stairs. Patient also presents with L quadriceps and HS weakness, which also limits her standing tolerance. In addition, proximal strength deficits in her L hip girdle contribute to compensatory stress on her L knee when squatting. Patient was educated in an illustrated HEP for knee mobility and quad strength and was able to complete exercises without pain. Patient would benefit from skilled PT services to address the listed impairments to facilitate a return to PLOF. Thank you for the referral.   Impairments: abnormal muscle firing, abnormal muscle tone, abnormal or restricted ROM, abnormal movement, activity intolerance, impaired physical strength, lacks appropriate home exercise program and pain with function  Functional limitations: ambulation, stair negotiation, squatting  Symptom irritability: lowBarriers to therapy: none  Understanding of Dx/Px/POC: good   Prognosis: good  Prognosis details: Positive prognostic factors include positive attitude towards recovery. No negative prognostic factors.     Goals  STG:  Patient will be independent with home exercise program.   Patient will be able to perform a proper QS on her L LE to improve EXT ROM for standing. LTG:  Patient will increase L knee EXT and FLX AROM to be comparable to the contralateral side to improve her quality of gait mechanics. Patient will increase L quad/HS strength to at least 4+/5 to be able to ambulate longer distances. Patient will increase L hip strength by at least 1-2 grades via MMT to reduce stress on L knee during ADL. Patient will be able to ambulate longer distances. Patient will be able to manage symptoms independently. Plan  Plan details: Prognosis is above given PT services 2x/week tapering to 1x/week over the next 6 weeks and given HEP adherence. Patient would benefit from: skilled physical therapy  Referral necessary: No  Planned modality interventions: cryotherapy and thermotherapy: hydrocollator packs  Planned therapy interventions: activity modification, joint mobilization, kinesiology taping, manual therapy, IASTM, massage, Tellez taping, motor coordination training, neuromuscular re-education, patient education, strengthening, stretching, therapeutic activities, therapeutic exercise, body mechanics training, flexibility, functional ROM exercises, balance, graded activity, graded exercise, gait training and home exercise program  Frequency: 2x week  Duration in weeks: 6  Treatment plan discussed with: patient        Subjective Evaluation    History of Present Illness  Date of onset: 8/24/2023  Mechanism of injury: trauma  Mechanism of injury: This is a 48 y.o. female presenting with L knee pain since a fall down her stairs on 8/24/23. She received an MRI that showed an MCL sprain and patellar cartilage tear. She also has a previous history of an ACL tear in her L knee from May 2023 when she fell off of a step when exercising. She has not received surgery on her knee to address her ACL tear, but she does have a history of L mensicus surgery from 2004.  She has the most difficulty with standing, walking, going up and down the stairs, and squatting. She is currently wearing a hinged brace. Not a recurrent problem   Quality of life: good    Patient Goals  Patient goals for therapy: decreased pain, increased motion, increased strength and independence with ADLs/IADLs  Patient goal: to be able to stand, walk, climb stairs, squat  Pain  Current pain ratin  At best pain ratin  At worst pain ratin  Location: L medial knee/anterior knee, occasional radiation into L great toe  Quality: radiating  Relieving factors: ice  Aggravating factors: standing, walking and stair climbing (squatting)  Progression: improved    Social Support    Employment status: working (home health)    Diagnostic Tests  X-ray: normal  MRI studies: abnormal (MCL sprain/patellar cartilage tear, known ACL tear from May 2023)  Treatments  No previous or current treatments        Objective     Observations   Left Knee   Negative for edema. Tenderness   Left Knee   Tenderness in the medial joint line and medial patella. Active Range of Motion   Left Knee   Flexion: 100 degrees   Extension: 2 degrees     Right Knee   Flexion: 120 degrees   Extension: 0 degrees     Passive Range of Motion   Left Hip   External rotation (90/90): [facility] [place]  External rotation (prone): [facility] [place]  Internal rotation (90/90): [facility] [place]  Internal rotation (prone): WFL    Right Hip   External rotation (90/90): [facility] [place]  External rotation (prone): [facility] [place]  Internal rotation (90/90):  [facility] [place]  Internal rotation (prone): [facility] [place]    Mobility   Patellar Mobility:   Left Knee   Hypomobile: left medial, left lateral, left superior and left inferior    Right Knee   WFL: medial, lateral, superior and inferior    Strength/Myotome Testing     Left Hip   Planes of Motion   Extension: 3+  Abduction: 3+    Isolated Muscles   Gluteus saad: 3+    Right Hip   Planes of Motion   Extension: 4-  Abduction: 4-    Isolated Muscles   Gluteus maximums: 4-    Left Knee   Flexion: 2+  Extension: 2+  Quadriceps contraction: fair    Right Knee Flexion: 5  Extension: 5  Quadriceps contraction: good    Tests     Left Knee   Positive active quad. Right Knee   Negative active quad. Ambulation   Weight-Bearing Status   Weight-Bearing Status (Left): weight-bearing as tolerated   hinge brace             Precautions: L MCL sprain/patellar cartilage tear (DOI: 8/24/23), hx of L ACL tear (5/2023), hx of L meniscus surgery (2004)    HEP: QS (towel knee), strap gastroc stretch, heel slides  Manuals 9/14                                                                Neuro Re-Ed                                                                                                        Ther Ex             QS 5"x20 towel knee HEP + towel ankle           Strap gastroc stretch 30"x4 HEP            Heel slides 5"x20 HEP            4-way SLR NV            Prone quad stretch?              Seated HS stretch             HEP education and instruction  x8'                                      Ther Activity                                       Gait Training                                       Modalities

## 2023-09-14 NOTE — LETTER
2023    Pawan Landa MD  [address] [place] [address]    Patient: Satish Garcias   YOB: 1970   Date of Visit: 2023     Encounter Diagnosis     ICD-10-CM    1. Acute pain of left knee  M25.562           Dear Dr. Mahmood Primes: Thank you for your recent referral of Satish Garcias. Please review the attached evaluation summary from Josefa's recent visit. Please verify that you agree with the plan of care by signing the attached order. If you have any questions or concerns, please do not hesitate to call. I sincerely appreciate the opportunity to share in the care of one of your patients and hope to have another opportunity to work with you in the near future. Sincerely,    Chris Oswald, PT      Referring Provider:      I certify that I have read the below Plan of Care and certify the need for these services furnished under this plan of treatment while under my care. Pawan Landa MD  [address] [place] [address]  Via Fax: [phone]          PT Evaluation     Today's date: 2023  Patient name: Satish Garcias  : 1970  MRN: [id number]  Referring provider: Pawan Landa*  Dx:   Encounter Diagnosis     ICD-10-CM    1. Acute pain of left knee  M25.562                      Assessment  Assessment details: Satish Garcias is a pleasant 48 y.o. female who presents with acute L knee pain since injury sustained when falling on 23. MRI of her knee showed a L MCL sprain and patellar cartilage tear. She also has a known L ACL tear from May 2023 that was treated conservatively. No further referral is necessary based upon examination results. Primary movement impairment is L knee hypomobility as a result of injury sustained 3 weeks ago (DOI: 23), which limits her ability to ambulate and negotiate stairs.  Patient also presents with L quadriceps and HS weakness, which also limits her standing tolerance. In addition, proximal strength deficits in her L hip girdle contribute to compensatory stress on her L knee when squatting. Patient was educated in an illustrated HEP for knee mobility and quad strength and was able to complete exercises without pain. Patient would benefit from skilled PT services to address the listed impairments to facilitate a return to PLOF. Thank you for the referral.   Impairments: abnormal muscle firing, abnormal muscle tone, abnormal or restricted ROM, abnormal movement, activity intolerance, impaired physical strength, lacks appropriate home exercise program and pain with function  Functional limitations: ambulation, stair negotiation, squatting  Symptom irritability: lowBarriers to therapy: none  Understanding of Dx/Px/POC: good   Prognosis: good  Prognosis details: Positive prognostic factors include positive attitude towards recovery. No negative prognostic factors. Goals  STG:  Patient will be independent with home exercise program.   Patient will be able to perform a proper QS on her L LE to improve EXT ROM for standing. LTG:  Patient will increase L knee EXT and FLX AROM to be comparable to the contralateral side to improve her quality of gait mechanics. Patient will increase L quad/HS strength to at least 4+/5 to be able to ambulate longer distances. Patient will increase L hip strength by at least 1-2 grades via MMT to reduce stress on L knee during ADL. Patient will be able to ambulate longer distances. Patient will be able to manage symptoms independently. Plan  Plan details: Prognosis is above given PT services 2x/week tapering to 1x/week over the next 6 weeks and given HEP adherence.   Patient would benefit from: skilled physical therapy  Referral necessary: No  Planned modality interventions: cryotherapy and thermotherapy: hydrocollator packs  Planned therapy interventions: activity modification, joint mobilization, kinesiology taping, manual therapy, IASTM, massage, Tellez taping, motor coordination training, neuromuscular re-education, patient education, strengthening, stretching, therapeutic activities, therapeutic exercise, body mechanics training, flexibility, functional ROM exercises, balance, graded activity, graded exercise, gait training and home exercise program  Frequency: 2x week  Duration in weeks: 6  Treatment plan discussed with: patient        Subjective Evaluation    History of Present Illness  Date of onset: 2023  Mechanism of injury: trauma  Mechanism of injury: This is a 48 y.o. female presenting with L knee pain since a fall down her stairs on 23. She received an MRI that showed an MCL sprain and patellar cartilage tear. She also has a previous history of an ACL tear in her L knee from May 2023 when she fell off of a step when exercising. She has not received surgery on her knee to address her ACL tear, but she does have a history of L mensicus surgery from . She has the most difficulty with standing, walking, going up and down the stairs, and squatting. She is currently wearing a hinged brace.           Not a recurrent problem   Quality of life: good    Patient Goals  Patient goals for therapy: decreased pain, increased motion, increased strength and independence with ADLs/IADLs  Patient goal: to be able to stand, walk, climb stairs, squat  Pain  Current pain ratin  At best pain ratin  At worst pain ratin  Location: L medial knee/anterior knee, occasional radiation into L great toe  Quality: radiating  Relieving factors: ice  Aggravating factors: standing, walking and stair climbing (squatting)  Progression: improved    Social Support    Employment status: working (home health)    Diagnostic Tests  X-ray: normal  MRI studies: abnormal (MCL sprain/patellar cartilage tear, known ACL tear from May 2023)  Treatments  No previous or current treatments        Objective     Observations   Left Knee Negative for edema. Tenderness   Left Knee   Tenderness in the medial joint line and medial patella. Active Range of Motion   Left Knee   Flexion: 100 degrees   Extension: 2 degrees     Right Knee   Flexion: 120 degrees   Extension: 0 degrees     Passive Range of Motion   Left Hip   External rotation (90/90): [facility] [place]  External rotation (prone): [facility] [place]  Internal rotation (90/90): [facility] [place]  Internal rotation (prone): WFL    Right Hip   External rotation (90/90): [facility] [place]  External rotation (prone): [facility] [place]  Internal rotation (90/90): [facility] [place]  Internal rotation (prone): [facility] [place]    Mobility   Patellar Mobility:   Left Knee   Hypomobile: left medial, left lateral, left superior and left inferior    Right Knee   WFL: medial, lateral, superior and inferior    Strength/Myotome Testing     Left Hip   Planes of Motion   Extension: 3+  Abduction: 3+    Isolated Muscles   Gluteus saad: 3+    Right Hip   Planes of Motion   Extension: 4-  Abduction: 4-    Isolated Muscles   Gluteus maximums: 4-    Left Knee   Flexion: 2+  Extension: 2+  Quadriceps contraction: fair    Right Knee   Flexion: 5  Extension: 5  Quadriceps contraction: good    Tests     Left Knee   Positive active quad. Right Knee   Negative active quad. Ambulation   Weight-Bearing Status   Weight-Bearing Status (Left): weight-bearing as tolerated   hinge brace            Precautions: L MCL sprain/patellar cartilage tear (DOI: 8/24/23), hx of L ACL tear (5/2023), hx of L meniscus surgery (2004)    HEP: QS (towel knee), strap gastroc stretch, heel slides  Manuals 9/14                                                                Neuro Re-Ed                                                                                                        Ther Ex             QS 5"x20 towel knee HEP + towel ankle           Strap gastroc stretch 30"x4 HEP            Heel slides 5"x20 HEP            4-way SLR NV            Prone quad stretch?              Seated HS stretch             HEP education and instruction  x8'                                      Ther Activity                                       Gait Training                                       Modalities

## 2023-09-19 ENCOUNTER — APPOINTMENT (OUTPATIENT)
Dept: PHYSICAL THERAPY | Facility: MEDICAL CENTER | Age: 53
End: 2023-09-19
Payer: COMMERCIAL

## 2023-09-21 ENCOUNTER — OFFICE VISIT (OUTPATIENT)
Dept: PHYSICAL THERAPY | Facility: MEDICAL CENTER | Age: 53
End: 2023-09-21
Payer: COMMERCIAL

## 2023-09-21 DIAGNOSIS — M25.562 ACUTE PAIN OF LEFT KNEE: Primary | ICD-10-CM

## 2023-09-21 PROCEDURE — 97110 THERAPEUTIC EXERCISES: CPT | Performed by: PHYSICAL THERAPIST

## 2023-09-21 NOTE — PROGRESS NOTES
Daily Note     Today's date: 2023  Patient name: Bryant Peralta  : 1970  MRN: [id number]  Referring provider: Yu Miller*  Dx:   Encounter Diagnosis     ICD-10-CM    1. Acute pain of left knee  M25.562                      Subjective: Patient reports that her knee is feeling better with less pain compared to last visit. She is also able to walk longer distances compared to previously. Objective: See treatment diary below    L knee AROM: 0-120 deg    Assessment: Patient has demonstrated excellent progress with improving L knee AROM since last session, and L knee AROM is [facility] and comparable to the contralateral side. Initiated knee flexibility and quad/hip girdle strengthening program as outlined below. Quad set quality also significantly improved. Patient is demonstrating good progress toward her overall goals. She was educated in an updated illustrated HEP with addition of SLR to further improve quad strength. Due to good progress, plan to reduce frequency to 1x/week to focus on strengthening. Patient tolerated treatment well and completed program without pain. Patient would benefit from continued PT to progress quad/hip girdle strengthening to be able to ambulate longer distances and to return to active lifestyle. Goals  STG:  Patient will be independent with home exercise program.- met   Patient will be able to perform a proper QS on her L LE to improve EXT ROM for standing.- met  LTG:  Patient will increase L knee EXT and FLX AROM to be comparable to the contralateral side to improve her quality of gait mechanics. - met  Patient will increase L quad/HS strength to at least 4+/5 to be able to ambulate longer distances. - in progress (improved)  Patient will increase L hip strength by at least 1-2 grades via MMT to reduce stress on L knee during ADL. - in progress (improved)  Patient will be able to ambulate longer distances. - in progress (improved)  Patient will be able to manage symptoms independently. - in progress     Plan: Due to good progress toward goals, plan to reduce frequency to 1x/week for 5 more weeks.      Precautions: L MCL sprain/patellar cartilage tear (DOI: 8/24/23), hx of L ACL tear (5/2023), hx of L meniscus surgery (2004)    HEP: QS (towel knee), strap gastroc stretch, heel slides  Manuals 9/14 9/21                                                               Neuro Re-Ed                                                                                                        Ther Ex             QS 5"x20 towel knee HEP 5"x30 ea towel knee/towel ankle           Strap gastroc stretch 30"x4 HEP 30"x4           Heel slides 5"x20 HEP 5"x30           4-way SLR NV 2x10 ea           Supine hip ADD isometrics  5"x30           Supine clams  5"x30 RTB           Seated HS stretch  20"x4           HEP education and instruction  x8' x5'                                     Ther Activity                                       Gait Training                                       Modalities

## 2023-09-22 ENCOUNTER — APPOINTMENT (OUTPATIENT)
Dept: PHYSICAL THERAPY | Facility: MEDICAL CENTER | Age: 53
End: 2023-09-22
Payer: COMMERCIAL

## 2023-09-25 ENCOUNTER — APPOINTMENT (OUTPATIENT)
Dept: PHYSICAL THERAPY | Facility: MEDICAL CENTER | Age: 53
End: 2023-09-25
Payer: COMMERCIAL

## 2023-09-26 ENCOUNTER — OFFICE VISIT (OUTPATIENT)
Dept: PHYSICAL THERAPY | Facility: MEDICAL CENTER | Age: 53
End: 2023-09-26
Payer: COMMERCIAL

## 2023-09-26 DIAGNOSIS — M25.562 ACUTE PAIN OF LEFT KNEE: Primary | ICD-10-CM

## 2023-09-26 PROCEDURE — 97110 THERAPEUTIC EXERCISES: CPT | Performed by: PHYSICAL THERAPIST

## 2023-09-26 NOTE — PROGRESS NOTES
Daily Note     Today's date: 2023  Patient name: Jah Aparicio  : 1970  MRN: [id number]  Referring provider: Ryann Johnson*  Dx:   Encounter Diagnosis     ICD-10-CM    1. Acute pain of left knee  M25.562                      Subjective: Patient reports that she had some mild soreness after last session that resolved within a few hours and did not carry into the following day. She reports that she has some soreness today after going to the chiropractor yesterday and going on a walk this morning. She does note that her knee did buckle a few days ago when going down the stairs, but she did not fall. Objective: See treatment diary below    L knee AROM: 0-120 deg      Assessment: Patient continues to demonstrate good progress with improving L knee AROM in all planes, and L knee AROM is now comparable to the contralateral side. Continued with knee flexibility program to ensure maintenance of ROM and quad/hip girdle strengthening program as outlined below. Decreased reps for QS with towel under ankle due to patellofemoral discomfort during this intervention. Also decreased reps for FLX SLR due to difficulty controlling motion without EXT lag. Held progressions for quad strengthening due to baseline medial patellofemoral soreness. Added heel raises/toe raises to improve LE strength with cueing provided to prevent compensation from trunk. Patient tolerated treatment well and completed program without pain. Patient would benefit from continued PT to improve knee and hip girdle strength to be able to ambulate longer distances. Plan: Continue per plan of care.       Precautions: L MCL sprain/patellar cartilage tear (DOI: 23), hx of L ACL tear (2023), hx of L meniscus surgery ()    HEP: QS (towel knee), strap gastroc stretch, heel slides  Manuals                                                               Neuro Re-Ed Ther Ex             QS 5"x20 towel knee HEP 5"x30 ea towel knee/towel ankle 5"x40 ea towel knee/5"x10 towel ankle          Strap gastroc stretch 30"x4 HEP 30"x4 30"x4          Heel slides 5"x20 HEP 5"x30 5"x30          4-way SLR NV 2x10 ea x10 FLX, 2x10 ABD/ADD/EXT          Supine hip ADD isometrics  5"x30 5"x30          Supine clams  5"x30 RTB 5"x30 RTB          Seated HS stretch  20"x4 30"x4          HR/TR   5"x20 ea          HEP education and instruction  x8' x5'                                     Ther Activity                                       Gait Training                                       Modalities

## 2023-09-29 ENCOUNTER — APPOINTMENT (OUTPATIENT)
Dept: PHYSICAL THERAPY | Facility: MEDICAL CENTER | Age: 53
End: 2023-09-29
Payer: COMMERCIAL

## 2023-10-03 ENCOUNTER — EVALUATION (OUTPATIENT)
Dept: PHYSICAL THERAPY | Facility: MEDICAL CENTER | Age: 53
End: 2023-10-03
Payer: COMMERCIAL

## 2023-10-03 DIAGNOSIS — M25.562 ACUTE PAIN OF LEFT KNEE: Primary | ICD-10-CM

## 2023-10-03 PROCEDURE — 97110 THERAPEUTIC EXERCISES: CPT | Performed by: PHYSICAL THERAPIST

## 2023-10-03 NOTE — PROGRESS NOTES
PT Re-Evaluation     Today's date: 10/3/2023  Patient name: Skylar Glroia  : 1970  MRN: [id number]  Referring provider: Charlene Moody*  Dx:   Encounter Diagnosis     ICD-10-CM    1. Acute pain of left knee  M25.562                      Subjective: Patient reports that her knee is continuing to feel improved overall. She is having less pain when walking in the community, and she is able to walk longer distances compared to previously. She continues to have some aching and discomfort after a lot of activity. She would like to continue to work on her strength to be able to squat and go down the stairs. She has a follow-up with her physician this upcoming Thursday (10/5). Objective: See treatment diary below    Knee AROM:  L: 0-122 deg  R: 0-122 deg    Knee strength:  FLX: L: 3+/5, R: 5/5  EXT: L: 3+/5, R: 5/5    Hip ABD strength:  L: 3+/5, R: 4-/5    DL squat: Forward trunk lean bilaterally, L toe out    SLS:   L: 8s, ipsilateral side bending  R: 10s, WFL     Outcome measure:  FOTO:   57/100 on 23, 80/100 on 10/3/23    Assessment: Patient has demonstrated excellent progress with improving L knee AROM over the last few weeks, and L knee AROM is now [facility] and comparable to the contralateral side. This has improved her quality of gait mechanics for ambulation. Although improved since IE, patient continues to demonstrate strength deficits in her L quad and HS when compared to the contralateral side. This limits her ability to squat during ADL to her prior capacity. Patient also presents with proximal L hip girdle weakness and concurrent L LE balance dysfunction, which causes compensatory stress on her L knee when descending stairs. Patient is demonstrating steady progress toward her goals. She was able to initiate resistance for 4-way SLR today, which further demonstrates good progress. Patient tolerated treatment well and completed program without pain.  She was educated in appropriate "step to" mechanics for stair negotiation to minimize stress on her knee. Patient has been compliant with both PT session attendance and HEP performance. Patient would benefit from continued PT to further progress CKC quad/hip girdle strengthening to facilitate an improvement in her ability to squat and descend stairs to her prior capacity. Goals  STG:  Patient will be independent with home exercise program.- met   Patient will be able to perform a proper QS on her L LE to improve EXT ROM for standing.- met  LTG:  Patient will increase L knee EXT and FLX AROM to be comparable to the contralateral side to improve her quality of gait mechanics. - met  Patient will increase L quad/HS strength to at least 4+/5 to be able to ambulate longer distances. - in progress (improved)  Patient will increase L hip strength by at least 1-2 grades via MMT to reduce stress on L knee during ADL. - in progress (improved)  Patient will be able to ambulate longer distances. - in progress  Patient will be able to manage symptoms independently. - in progress       Plan: Continue per POC. 1x/week for 3 more weeks.      Precautions: L MCL sprain/patellar cartilage tear (DOI: 8/24/23), hx of L ACL tear (5/2023), hx of L meniscus surgery (2004)    HEP: QS (towel knee), strap gastroc stretch, heel slides, SLR  Manuals 9/14 9/21 9/26 10/3                                                             Neuro Re-Ed                                                                                                        Ther Ex             QS 5"x20 towel knee HEP 5"x30 ea towel knee/towel ankle 5"x40 ea towel knee/5"x10 towel ankle 5"x40  towel knee, 5"x30  towel ankle         Strap gastroc stretch 30"x4 HEP 30"x4 30"x4 HEP         Heel slides 5"x20 HEP 5"x30 5"x30 5"x30         4-way SLR NV 2x10 ea x10 FLX, 2x10 ABD/ADD/EXT 2x10 1# ea         Supine hip ADD isometrics  5"x30 5"x30 5"x30         Supine clams  5"x30 RTB 5"x30 RTB 5"x20 GTB         Seated HS stretch  20"x4 30"x4 np         HR/TR   5"x20 ea 5"x30 ea         Standing hip ABD    x10 ea         HEP education and instruction  x8' x5'                                     Ther Activity             Stair negotiation    x2'                      Gait Training                                       Modalities

## 2023-10-06 ENCOUNTER — APPOINTMENT (OUTPATIENT)
Dept: PHYSICAL THERAPY | Facility: MEDICAL CENTER | Age: 53
End: 2023-10-06
Payer: COMMERCIAL

## 2023-10-10 ENCOUNTER — APPOINTMENT (OUTPATIENT)
Dept: PHYSICAL THERAPY | Facility: MEDICAL CENTER | Age: 53
End: 2023-10-10
Payer: COMMERCIAL

## 2023-10-13 ENCOUNTER — APPOINTMENT (OUTPATIENT)
Dept: PHYSICAL THERAPY | Facility: MEDICAL CENTER | Age: 53
End: 2023-10-13
Payer: COMMERCIAL

## 2023-10-17 ENCOUNTER — APPOINTMENT (OUTPATIENT)
Dept: PHYSICAL THERAPY | Facility: MEDICAL CENTER | Age: 53
End: 2023-10-17
Payer: COMMERCIAL

## 2023-10-20 ENCOUNTER — APPOINTMENT (OUTPATIENT)
Dept: PHYSICAL THERAPY | Facility: MEDICAL CENTER | Age: 53
End: 2023-10-20
Payer: COMMERCIAL

## 2023-10-24 ENCOUNTER — OFFICE VISIT (OUTPATIENT)
Dept: PHYSICAL THERAPY | Facility: MEDICAL CENTER | Age: 53
End: 2023-10-24
Payer: COMMERCIAL

## 2023-10-24 DIAGNOSIS — M25.562 ACUTE PAIN OF LEFT KNEE: Primary | ICD-10-CM

## 2023-10-24 PROCEDURE — 97110 THERAPEUTIC EXERCISES: CPT | Performed by: PHYSICAL THERAPIST

## 2023-10-24 NOTE — PROGRESS NOTES
Discharge Summary    Today's date: 10/24/2023  Patient name: Amairani Melendrez  : 1970  MRN: [id number]  Referring provider: Loki El*  Dx:   Encounter Diagnosis     ICD-10-CM    1. Acute pain of left knee  M25.562                      Subjective: Patient reports that her knee is continuing to feel improved. She is able to walk, go up and down her stairs reciprocally, and perform all of her household tasks without limitation. She reports that she returned to her physician, and she was advised to wean out of her brace. She has been continuing to wear it for longer distance walking as needed but has been weaning out of her brace at home. She is pleased with her overall progress, and she feels ready to be discharged to her home program.      Objective: See treatment diary below    Knee AROM:  0-122 bilaterally    Knee strength:   FLX/EXT: 5/5 bilaterally    Hip ABD strength:   5/5 bilaterally    Assessment: Patient has demonstrated excellent progress with improving L knee AROM since her initial visit, and L knee AROM is now [facility] and comparable to the contralateral side in all planes. This has improved her quality of gait mechanics and has improved her ability to ambulate longer distances. Patient has also demonstrated steady progress with improving her L quad/HS strength, which has further improved her ability to perform household tasks. In addition, proximal hip girdle strength has improved, which has reduced the compensatory stress on her knee when negotiating stairs. Patient is independent in an illustrated HEP for maintenance knee mobility and continued quad/hip girdle strengthening. Patient has met all of her goals for PT and has reached her maximum benefit from formal PT services. Patient is agreeable to be discharged to her HEP.      Goals  STG:  Patient will be independent with home exercise program.- met   Patient will be able to perform a proper QS on her L LE to improve EXT ROM for standing.- met  LTG:  Patient will increase L knee EXT and FLX AROM to be comparable to the contralateral side to improve her quality of gait mechanics. - met  Patient will increase L quad/HS strength to at least 4+/5 to be able to ambulate longer distances. - in progress (improved)  Patient will increase L hip strength by at least 1-2 grades via MMT to reduce stress on L knee during ADL. - in progress (improved)  Patient will be able to ambulate longer distances. - in progress  Patient will be able to manage symptoms independently. - in progress     Plan: Discharge to [facility].      Precautions: L MCL sprain/patellar cartilage tear (DOI: 8/24/23), hx of L ACL tear (5/2023), hx of L meniscus surgery (2004)      Manuals 9/14 9/21 9/26 10/3 10/24                                                            Neuro Re-Ed                                                                                                        Ther Ex             QS 5"x20 towel knee HEP 5"x30 ea towel knee/towel ankle 5"x40 ea towel knee/5"x10 towel ankle 5"x40  towel knee, 5"x30  towel ankle 5"x40 towel knee/5"x30 towel ankle        Strap gastroc stretch 30"x4 HEP 30"x4 30"x4 HEP HEP        Heel slides 5"x20 HEP 5"x30 5"x30 5"x30 5"x30        4-way SLR NV 2x10 ea x10 FLX, 2x10 ABD/ADD/EXT 2x10 1# ea 3x10 1# ea        Supine hip ADD isometrics  5"x30 5"x30 5"x30 np        Supine clams  5"x30 RTB 5"x30 RTB 5"x20 GTB 5"x30 GTB        Seated HS stretch  20"x4 30"x4 np         HR/TR   5"x20 ea 5"x30 ea 5"x30 ea        Standing hip ABD    x10 ea 2x10 ea        Standing march     X20 ea        HEP education and instruction  x8' x5'   x5'                                  Ther Activity             Stair negotiation    x2'                      Gait Training                                       Modalities

## 2023-10-27 ENCOUNTER — APPOINTMENT (OUTPATIENT)
Dept: PHYSICAL THERAPY | Facility: MEDICAL CENTER | Age: 53
End: 2023-10-27
Payer: COMMERCIAL

## 2023-11-06 RX ORDER — ONDANSETRON 2 MG/ML
4 INJECTION INTRAMUSCULAR; INTRAVENOUS ONCE AS NEEDED
Status: CANCELLED | OUTPATIENT
Start: 2023-11-06

## 2023-11-06 RX ORDER — SODIUM CHLORIDE 9 MG/ML
125 INJECTION, SOLUTION INTRAVENOUS CONTINUOUS
Status: CANCELLED | OUTPATIENT
Start: 2023-11-06

## 2023-11-07 ENCOUNTER — ANESTHESIA (OUTPATIENT)
Dept: GASTROENTEROLOGY | Facility: HOSPITAL | Age: 53
End: 2023-11-07

## 2023-11-07 ENCOUNTER — HOSPITAL ENCOUNTER (OUTPATIENT)
Dept: GASTROENTEROLOGY | Facility: HOSPITAL | Age: 53
Setting detail: OUTPATIENT SURGERY
Discharge: HOME | End: 2023-11-07
Attending: INTERNAL MEDICINE
Payer: COMMERCIAL

## 2023-11-07 ENCOUNTER — ANESTHESIA EVENT (OUTPATIENT)
Dept: GASTROENTEROLOGY | Facility: HOSPITAL | Age: 53
End: 2023-11-07

## 2023-11-07 VITALS
BODY MASS INDEX: 34.23 KG/M2 | DIASTOLIC BLOOD PRESSURE: 66 MMHG | WEIGHT: 186 LBS | RESPIRATION RATE: 21 BRPM | HEIGHT: 62 IN | TEMPERATURE: 97.6 F | SYSTOLIC BLOOD PRESSURE: 108 MMHG | OXYGEN SATURATION: 97 % | HEART RATE: 80 BPM

## 2023-11-07 DIAGNOSIS — K21.00 GASTROESOPHAGEAL REFLUX DISEASE WITH ESOPHAGITIS WITHOUT HEMORRHAGE: ICD-10-CM

## 2023-11-07 PROBLEM — F31.9 BIPOLAR DISORDER (HCC): Status: ACTIVE | Noted: 2023-11-07

## 2023-11-07 PROBLEM — F32.A DEPRESSION: Status: ACTIVE | Noted: 2023-11-07

## 2023-11-07 PROBLEM — F41.9 ANXIETY: Status: ACTIVE | Noted: 2023-11-07

## 2023-11-07 PROBLEM — T14.91XA SUICIDE ATTEMPT (HCC): Status: ACTIVE | Noted: 2023-11-07

## 2023-11-07 PROBLEM — K21.9 GERD (GASTROESOPHAGEAL REFLUX DISEASE): Status: ACTIVE | Noted: 2023-11-07

## 2023-11-07 PROBLEM — F41.0 PANIC ATTACKS: Status: ACTIVE | Noted: 2023-11-07

## 2023-11-07 LAB
EXT PREGNANCY TEST URINE: NEGATIVE
EXT. CONTROL: NORMAL

## 2023-11-07 PROCEDURE — 88305 TISSUE EXAM BY PATHOLOGIST: CPT | Performed by: STUDENT IN AN ORGANIZED HEALTH CARE EDUCATION/TRAINING PROGRAM

## 2023-11-07 PROCEDURE — 81025 URINE PREGNANCY TEST: CPT | Performed by: ANESTHESIOLOGY

## 2023-11-07 PROCEDURE — 43239 EGD BIOPSY SINGLE/MULTIPLE: CPT | Performed by: INTERNAL MEDICINE

## 2023-11-07 RX ORDER — LIDOCAINE HYDROCHLORIDE 20 MG/ML
INJECTION, SOLUTION EPIDURAL; INFILTRATION; INTRACAUDAL; PERINEURAL AS NEEDED
Status: DISCONTINUED | OUTPATIENT
Start: 2023-11-07 | End: 2023-11-07

## 2023-11-07 RX ORDER — GLYCOPYRROLATE 0.2 MG/ML
INJECTION INTRAMUSCULAR; INTRAVENOUS AS NEEDED
Status: DISCONTINUED | OUTPATIENT
Start: 2023-11-07 | End: 2023-11-07

## 2023-11-07 RX ORDER — SODIUM CHLORIDE 9 MG/ML
125 INJECTION, SOLUTION INTRAVENOUS CONTINUOUS
Status: DISCONTINUED | OUTPATIENT
Start: 2023-11-07 | End: 2023-11-11 | Stop reason: HOSPADM

## 2023-11-07 RX ORDER — ONDANSETRON 2 MG/ML
4 INJECTION INTRAMUSCULAR; INTRAVENOUS ONCE AS NEEDED
Status: DISCONTINUED | OUTPATIENT
Start: 2023-11-07 | End: 2023-11-11 | Stop reason: HOSPADM

## 2023-11-07 RX ORDER — PROPOFOL 10 MG/ML
INJECTION, EMULSION INTRAVENOUS AS NEEDED
Status: DISCONTINUED | OUTPATIENT
Start: 2023-11-07 | End: 2023-11-07

## 2023-11-07 RX ADMIN — SODIUM CHLORIDE 125 ML/HR: 0.9 INJECTION, SOLUTION INTRAVENOUS at 12:20

## 2023-11-07 RX ADMIN — PROPOFOL 150 MG: 10 INJECTION, EMULSION INTRAVENOUS at 13:15

## 2023-11-07 RX ADMIN — GLYCOPYRROLATE 0.2 MG: 0.2 INJECTION INTRAMUSCULAR; INTRAVENOUS at 13:13

## 2023-11-07 RX ADMIN — LIDOCAINE HYDROCHLORIDE 100 MG: 20 INJECTION, SOLUTION EPIDURAL; INFILTRATION; INTRACAUDAL at 13:15

## 2023-11-07 NOTE — ANESTHESIA POSTPROCEDURE EVALUATION
Post-Op Assessment Note    CV Status:  Stable    Pain management: adequate     Mental Status:  Alert and awake   Hydration Status:  Euvolemic   PONV Controlled:  Controlled   Airway Patency:  Patent      Post Op Vitals Reviewed: Yes      Staff: Anesthesiologist, CRNA         No notable events documented.     BP      Temp      Pulse     Resp      SpO2      /66   Pulse 80   Temp 97.6 °F (36.4 °C) (Temporal)   Resp 21   Ht 5' 2" (1.575 m)   Wt 84.4 kg (186 lb)   LMP 10/14/2023   SpO2 97%   BMI 34.02 kg/m²

## 2023-11-07 NOTE — DISCHARGE INSTR - AVS FIRST PAGE
Please call [phone] with any problems. Your examination today showed the esophagitis is healed and you have a small hiatal hernia. Please stay on your present acid suppression and we will see you back in the office.   My office should call you for an appointment if you do not have 1 already

## 2023-11-07 NOTE — INTERVAL H&P NOTE
H&P reviewed. After examining the patient I find no changes in the patients condition since the H&P had been written.   Discussed with anesthesia  Vitals:    11/07/23 1216   BP: 119/71   Pulse: 65   Resp: 18   Temp: 97.6 °F (36.4 °C)   SpO2: 96%

## 2023-11-07 NOTE — ANESTHESIA PREPROCEDURE EVALUATION
Procedure:  EGD    Relevant Problems   GI/HEPATIC   (+) GERD (gastroesophageal reflux disease)      NEURO/PSYCH   (+) Anxiety   (+) Depression   (+) Panic attacks      Other   (+) History of esophagogastroduodenoscopy (EGD)   (+) Hx of adenomatous polyp of colon   (+) Suicide attempt [facility])        Physical Exam    Airway    Mallampati score: III  TM Distance: >3 FB  Neck ROM: full     Dental   No notable dental hx     Cardiovascular  Rhythm: regular, Rate: normal, Cardiovascular exam normal    Pulmonary  Pulmonary exam normal Breath sounds clear to auscultation    Other Findings      Anesthesia Plan  ASA Score- 2     Anesthesia Type- IV sedation with anesthesia with ASA Monitors. Additional Monitors:     Airway Plan:     Comment: GA prn. Plan Factors-    Chart reviewed. Existing labs reviewed. Patient summary reviewed. Patient is not a current smoker. Patient not instructed to abstain from smoking on day of procedure. Patient did not smoke on day of surgery. There is medical exclusion for perioperative obstructive sleep apnea risk education. Induction- intravenous. Postoperative Plan-     Informed Consent- Anesthetic plan and risks discussed with patient and father Guera Louise). I personally reviewed this patient with the CRNA. Discussed and agreed on the Anesthesia Plan with the CRNA. Kapil Meraz

## 2023-11-13 PROCEDURE — 88305 TISSUE EXAM BY PATHOLOGIST: CPT | Performed by: STUDENT IN AN ORGANIZED HEALTH CARE EDUCATION/TRAINING PROGRAM

## 2024-03-21 ENCOUNTER — ANNUAL EXAM (OUTPATIENT)
Dept: GYNECOLOGY | Facility: CLINIC | Age: 54
End: 2024-03-21
Payer: COMMERCIAL

## 2024-03-21 VITALS
BODY MASS INDEX: 38.61 KG/M2 | DIASTOLIC BLOOD PRESSURE: 74 MMHG | WEIGHT: 209.8 LBS | SYSTOLIC BLOOD PRESSURE: 120 MMHG | HEART RATE: 59 BPM | HEIGHT: 62 IN

## 2024-03-21 DIAGNOSIS — Z01.419 ENCOUNTER FOR GYNECOLOGICAL EXAMINATION WITHOUT ABNORMAL FINDING: Primary | ICD-10-CM

## 2024-03-21 DIAGNOSIS — N39.44 NOCTURNAL ENURESIS: ICD-10-CM

## 2024-03-21 DIAGNOSIS — Z12.31 ENCOUNTER FOR SCREENING MAMMOGRAM FOR MALIGNANT NEOPLASM OF BREAST: ICD-10-CM

## 2024-03-21 PROCEDURE — G0101 CA SCREEN;PELVIC/BREAST EXAM: HCPCS | Performed by: OBSTETRICS & GYNECOLOGY

## 2024-03-21 PROCEDURE — G0145 SCR C/V CYTO,THINLAYER,RESCR: HCPCS | Performed by: OBSTETRICS & GYNECOLOGY

## 2024-03-21 NOTE — PROGRESS NOTES
Assessment/Plan:       Diagnoses and all orders for this visit:    Encounter for gynecological examination without abnormal finding    Encounter for screening mammogram for malignant neoplasm of breast  -     Mammo screening bilateral w 3d & cad; Future    Nocturnal enuresis      Subjective:      Patient ID: Josefa Nicole is a 53 y.o. female.    HPI patient presents for annual examination.  Continues to have menses every 28 to 60 days.  Having occasional vasomotor symptoms . She denies any vaginal irritation, burning, discharge or bleeding.  She denies any dysuria, hematuria or stress urinary incontinence.  She has had a prior mid urethral sling.  She however is experiencing enuresis.  She has an appointment to see [facility] urology  scheduled for next week.  No GI complaints    Colonoscopy 2021.  Polyps identified.    The following portions of the patient's history were reviewed and updated as appropriate: She  has a past medical history of Anxiety, Bipolar disorder ([facility]), Depression, GERD (gastroesophageal reflux disease), Miscarriage (1993), Panic attacks, Suicide attempt ([facility]), and Urinary incontinence (I don't remember ).  She   Patient Active Problem List    Diagnosis Date Noted    Bipolar disorder (HCC) 2023    Panic attacks 2023    Suicide attempt (HCC) 2023    Anxiety 2023    Depression 2023    GERD (gastroesophageal reflux disease) 2023    History of esophagogastroduodenoscopy (EGD) 2023    Hx of adenomatous polyp of colon 2021     She  has a past surgical history that includes Bladder suspension; Tubal ligation;  section; Cholecystectomy; DILATION AND CURETTAGE OF UTERUS WITH HYSTEROSOCPY (N/A, 2021); and Colonoscopy (2021).  Her family history includes Diabetes in her maternal grandmother; Heart disease in her maternal grandfather.  She  reports that she has never smoked. She has never used smokeless  tobacco. She reports that she does not currently use alcohol after a past usage of about 2.0 standard drinks of alcohol per week. She reports that she does not use drugs.  Current Outpatient Medications   Medication Sig Dispense Refill    Ascorbic Acid (VITAMIN C) 100 MG CHEW Chew      B COMPLEX VITAMINS ER PO Take 1 tablet by mouth      buPROPion (WELLBUTRIN XL) 300 mg 24 hr tablet TAKE 1 TABLET EVERY MORNING      Calcium Carb-Cholecalciferol (CALCIUM 1000 + D PO)       citalopram (CeleXA) 40 mg tablet Take 40 mg by mouth daily      iloperidone (FANAPT) 8 MG Take 8 mg by mouth 2 (two) times a day      LORazepam (ATIVAN) 0.5 mg tablet Take 0.5 mg by mouth daily at bedtime      Multiple Vitamin (MULTI-VITAMIN DAILY) TABS Take by mouth      omeprazole (PriLOSEC) 20 mg delayed release capsule Take 1 capsule (20 mg total) by mouth daily 30 min before breakfast and dinner 30 capsule 3    topiramate (TOPAMAX) 25 mg sprinkle capsule Take 50 mg by mouth 3 (three) times a day      DOCOSAHEXAENOIC ACID PO Take 1 capsule by mouth (Patient not taking: Reported on 3/15/2022)      medroxyPROGESTERone (PROVERA) 10 mg tablet Take 1 tablet (10 mg total) by mouth daily for 10 days (Patient not taking: Reported on 3/15/2022) 10 tablet 0    naproxen sodium (ANAPROX) 550 mg tablet Take 1 tablet (550 mg total) by mouth 2 (two) times a day with meals for 30 doses (Patient not taking: Reported on 2/15/2023) 30 tablet 0     No current facility-administered medications for this visit.     Current Outpatient Medications on File Prior to Visit   Medication Sig    Ascorbic Acid (VITAMIN C) 100 MG CHEW Chew    B COMPLEX VITAMINS ER PO Take 1 tablet by mouth    buPROPion (WELLBUTRIN XL) 300 mg 24 hr tablet TAKE 1 TABLET EVERY MORNING    Calcium Carb-Cholecalciferol (CALCIUM 1000 + D PO)     citalopram (CeleXA) 40 mg tablet Take 40 mg by mouth daily    iloperidone (FANAPT) 8 MG Take 8 mg by mouth 2 (two) times a day    LORazepam (ATIVAN) 0.5 mg  "tablet Take 0.5 mg by mouth daily at bedtime    Multiple Vitamin (MULTI-VITAMIN DAILY) TABS Take by mouth    omeprazole (PriLOSEC) 20 mg delayed release capsule Take 1 capsule (20 mg total) by mouth daily 30 min before breakfast and dinner    topiramate (TOPAMAX) 25 mg sprinkle capsule Take 50 mg by mouth 3 (three) times a day    DOCOSAHEXAENOIC ACID PO Take 1 capsule by mouth (Patient not taking: Reported on 3/15/2022)    medroxyPROGESTERone (PROVERA) 10 mg tablet Take 1 tablet (10 mg total) by mouth daily for 10 days (Patient not taking: Reported on 3/15/2022)    naproxen sodium (ANAPROX) 550 mg tablet Take 1 tablet (550 mg total) by mouth 2 (two) times a day with meals for 30 doses (Patient not taking: Reported on 2/15/2023)     No current facility-administered medications on file prior to visit.     She has No Known Allergies..    Review of Systems   Constitutional: Negative.    HENT:  Negative for sore throat and trouble swallowing.    Gastrointestinal: Negative.    Genitourinary:  Positive for enuresis. Negative for dysuria, frequency, hematuria, pelvic pain, urgency, vaginal bleeding and vaginal discharge.         Objective:      /74   Pulse 59   Ht 5' 2\" (1.575 m)   Wt 95.2 kg (209 lb 12.8 oz)   LMP 02/14/2024   BMI 38.37 kg/m²          Physical Exam  Vitals reviewed.   Cardiovascular:      Rate and Rhythm: Normal rate and regular rhythm.      Pulses: Normal pulses.      Heart sounds: Normal heart sounds. No murmur heard.  Pulmonary:      Effort: Pulmonary effort is normal. No respiratory distress.      Breath sounds: Normal breath sounds.   Chest:   Breasts:     Right: No swelling, bleeding, inverted nipple, mass, nipple discharge, skin change or tenderness.      Left: No swelling, bleeding, inverted nipple, mass, nipple discharge, skin change or tenderness.   Abdominal:      General: There is no distension.      Palpations: Abdomen is soft. There is no mass.      Tenderness: There is no " abdominal tenderness. There is no guarding or rebound.      Hernia: No hernia is present. There is no hernia in the left inguinal area or right inguinal area.   Genitourinary:     General: Normal vulva.      Labia:         Right: No rash, tenderness or lesion.         Left: No rash, tenderness or lesion.       Vagina: Normal.      Cervix: Normal.      Uterus: Normal.       Adnexa:         Right: No mass, tenderness or fullness.          Left: No mass, tenderness or fullness.     Musculoskeletal:      Cervical back: Normal range of motion and neck supple. No tenderness.   Lymphadenopathy:      Cervical: No cervical adenopathy.      Upper Body:      Right upper body: No supraclavicular, axillary or pectoral adenopathy.      Left upper body: No supraclavicular, axillary or pectoral adenopathy.      Lower Body: No right inguinal adenopathy. No left inguinal adenopathy.   Neurological:      Mental Status: She is alert.

## 2024-03-28 LAB
LAB AP GYN PRIMARY INTERPRETATION: NORMAL
Lab: NORMAL

## 2024-05-20 RX ORDER — SODIUM CHLORIDE 9 MG/ML
125 INJECTION, SOLUTION INTRAVENOUS CONTINUOUS
Status: CANCELLED | OUTPATIENT
Start: 2024-05-20

## 2024-05-21 ENCOUNTER — ANESTHESIA EVENT (OUTPATIENT)
Dept: GASTROENTEROLOGY | Facility: HOSPITAL | Age: 54
End: 2024-05-21

## 2024-05-21 ENCOUNTER — ANESTHESIA (OUTPATIENT)
Dept: GASTROENTEROLOGY | Facility: HOSPITAL | Age: 54
End: 2024-05-21

## 2024-05-21 ENCOUNTER — HOSPITAL ENCOUNTER (OUTPATIENT)
Dept: GASTROENTEROLOGY | Facility: HOSPITAL | Age: 54
Setting detail: OUTPATIENT SURGERY
Discharge: HOME | End: 2024-05-21
Attending: INTERNAL MEDICINE
Payer: COMMERCIAL

## 2024-05-21 VITALS
BODY MASS INDEX: 38.09 KG/M2 | HEIGHT: 62 IN | TEMPERATURE: 98.5 F | OXYGEN SATURATION: 97 % | SYSTOLIC BLOOD PRESSURE: 119 MMHG | RESPIRATION RATE: 16 BRPM | HEART RATE: 81 BPM | DIASTOLIC BLOOD PRESSURE: 71 MMHG | WEIGHT: 207 LBS

## 2024-05-21 DIAGNOSIS — Z12.11 SCREENING FOR COLON CANCER: ICD-10-CM

## 2024-05-21 DIAGNOSIS — Z86.010 PERSONAL HISTORY OF COLONIC POLYPS: ICD-10-CM

## 2024-05-21 RX ORDER — SODIUM CHLORIDE 9 MG/ML
125 INJECTION, SOLUTION INTRAVENOUS CONTINUOUS
Status: DISCONTINUED | OUTPATIENT
Start: 2024-05-21 | End: 2024-05-25 | Stop reason: HOSPADM

## 2024-05-21 RX ORDER — PROPOFOL 10 MG/ML
INJECTION, EMULSION INTRAVENOUS AS NEEDED
Status: DISCONTINUED | OUTPATIENT
Start: 2024-05-21 | End: 2024-05-21

## 2024-05-21 RX ORDER — SODIUM CHLORIDE 9 MG/ML
INJECTION, SOLUTION INTRAVENOUS CONTINUOUS PRN
Status: DISCONTINUED | OUTPATIENT
Start: 2024-05-21 | End: 2024-05-21

## 2024-05-21 RX ADMIN — PROPOFOL 50 MG: 10 INJECTION, EMULSION INTRAVENOUS at 13:14

## 2024-05-21 RX ADMIN — PROPOFOL 50 MG: 10 INJECTION, EMULSION INTRAVENOUS at 13:09

## 2024-05-21 RX ADMIN — SODIUM CHLORIDE: 0.9 INJECTION, SOLUTION INTRAVENOUS at 13:06

## 2024-05-21 RX ADMIN — PROPOFOL 30 MG: 10 INJECTION, EMULSION INTRAVENOUS at 13:17

## 2024-05-21 RX ADMIN — PROPOFOL 150 MG: 10 INJECTION, EMULSION INTRAVENOUS at 13:08

## 2024-05-21 RX ADMIN — SODIUM CHLORIDE 125 ML/HR: 0.9 INJECTION, SOLUTION INTRAVENOUS at 12:55

## 2024-05-21 NOTE — ANESTHESIA POSTPROCEDURE EVALUATION
Post-Op Assessment Note    CV Status:  Stable    Pain management: adequate       Mental Status:  Alert and awake   Hydration Status:  Euvolemic   PONV Controlled:  Controlled   Airway Patency:  Patent     Post Op Vitals Reviewed: Yes    No anethesia notable event occurred.    Staff: Anesthesiologist               /55 (05/21/24 1324)    Temp 98.5 °F (36.9 °C) (05/21/24 1324)    Pulse 70 (05/21/24 1324)   Resp 16 (05/21/24 1324)    SpO2 95 % (05/21/24 1324)

## 2024-05-21 NOTE — INTERVAL H&P NOTE
H&P reviewed. After examining the patient I find no changes in the patients condition since the H&P had been written.    Vitals:    05/21/24 1245   BP: 118/64   Pulse: 64   Resp: 16   Temp: 98 °F (36.7 °C)   SpO2: 96%

## 2024-05-21 NOTE — DISCHARGE INSTR - AVS FIRST PAGE
Please call [phone] with any problems.  Your examination today was normal however the preparation was somewhat suboptimal.  Smaller lesions could have been missed.  I have suggested repeat exam with additional prep in 2 years.  Please call us if you have any symptoms

## 2024-05-21 NOTE — ANESTHESIA PREPROCEDURE EVALUATION
Procedure:  COLONOSCOPY    Relevant Problems   ANESTHESIA  Breathing difficulty during EGD per patient      CARDIO (within normal limits)      ENDO (within normal limits)      GI/HEPATIC   (+) GERD (gastroesophageal reflux disease)      NEURO/PSYCH   (+) Anxiety   (+) Depression   (+) Panic attacks      PULMONARY (within normal limits)        Physical Exam    Airway    Mallampati score: II  TM Distance: >3 FB  Neck ROM: full     Dental   No notable dental hx     Cardiovascular  Cardiovascular exam normal    Pulmonary  Pulmonary exam normal     Other Findings  post-pubertal.      Anesthesia Plan  ASA Score- 2     Anesthesia Type- IV sedation with anesthesia with ASA Monitors.         Additional Monitors:     Airway Plan:            Plan Factors-    Chart reviewed.    Patient summary reviewed.                  Induction- intravenous.    Postoperative Plan-         Informed Consent- Anesthetic plan and risks discussed with patient.

## 2024-12-10 ENCOUNTER — TELEPHONE (OUTPATIENT)
Dept: BARIATRICS | Facility: CLINIC | Age: 54
End: 2024-12-10

## 2025-03-10 ENCOUNTER — NURSE TRIAGE (OUTPATIENT)
Age: 55
End: 2025-03-10

## 2025-03-10 NOTE — TELEPHONE ENCOUNTER
"    FOLLOW UP: scheduled office visit 3/11    REASON FOR CONVERSATION: No chief complaint on file.    SYMPTOMS: patient states she has not had a regular period since August 2024    OTHER: Patient called complaining of not having a regular period since August. She states she would have some mild spotting when she would be due for her period but would not get a period. She has been irritable lately, started to have breast pain bilaterally on Saturday and started to have abdominal cramping yesterday. She rates the pain with the cramping a 5. She states she went to an emergent care for her breast pain and no lump was found. She denies redness, discharge, skin abnormalities. Scheduled office visit tomorrow, 3/11.     DISPOSITION: See Within 2 Weeks in Office  Reason for Disposition   Age > 40 years    Answer Assessment - Initial Assessment Questions  1. LMP:  \"When did your last menstrual period begin?\"      August     3. REGULARITY: \"How regular are your periods?\"      No   6. BIRTH CONTROL PILLS: \"Are you taking birth control pills, or have you stopped recently?\"      No   7. LONG-ACTING CONTRACEPTION: \"Has your doctor given you a shot to prevent pregnancy?\" (e.g., Depo-Provera injection) \"Do you have an intrauterine device (IUD)\".      No   8. CAUSE: \"What do you think caused the missed period?\" (e.g., stress, rapid weight loss, excessive exercise)      Unknown, possible menopause   9. OTHER SYMPTOMS: \"Do you have any other symptoms?\" (e.g., abdomen pain)      no    Protocols used: Menstrual Period - Missed or Late-Adult-[place]    "

## 2025-03-11 ENCOUNTER — OFFICE VISIT (OUTPATIENT)
Dept: GYNECOLOGY | Facility: CLINIC | Age: 55
End: 2025-03-11
Payer: COMMERCIAL

## 2025-03-11 DIAGNOSIS — N39.46 URINARY INCONTINENCE, MIXED: ICD-10-CM

## 2025-03-11 DIAGNOSIS — N93.9 ABNORMAL UTERINE BLEEDING (AUB): Primary | ICD-10-CM

## 2025-03-11 PROCEDURE — 99213 OFFICE O/P EST LOW 20 MIN: CPT | Performed by: OBSTETRICS & GYNECOLOGY

## 2025-03-11 RX ORDER — ALBUTEROL SULFATE 90 UG/1
1 INHALANT RESPIRATORY (INHALATION) EVERY 6 HOURS PRN
COMMUNITY
Start: 2025-02-17

## 2025-03-11 RX ORDER — DEXTROMETHORPHAN HYDROBROMIDE AND PROMETHAZINE HYDROCHLORIDE 15; 6.25 MG/5ML; MG/5ML
5 SYRUP ORAL 4 TIMES DAILY PRN
COMMUNITY
Start: 2025-01-23

## 2025-03-11 RX ORDER — DOXYCYCLINE HYCLATE 100 MG
1 TABLET ORAL 2 TIMES DAILY
COMMUNITY
Start: 2025-02-17

## 2025-03-11 RX ORDER — DICLOFENAC SODIUM 75 MG/1
TABLET, DELAYED RELEASE ORAL
COMMUNITY
Start: 2025-02-18

## 2025-03-11 RX ORDER — MEDROXYPROGESTERONE ACETATE 10 MG
10 TABLET ORAL DAILY
Qty: 10 TABLET | Refills: 0 | Status: SHIPPED | OUTPATIENT
Start: 2025-03-11 | End: 2025-03-21

## 2025-03-11 RX ORDER — GABAPENTIN 100 MG/1
100 CAPSULE ORAL 3 TIMES DAILY
COMMUNITY

## 2025-03-11 NOTE — PROGRESS NOTES
Name: Josefa Nicole      : 1970      MRN: [id number]  Encounter Provider: Eddie Ngo DO  Encounter Date: 3/11/2025   Encounter department: [facility]  :  Assessment & Plan  Abnormal uterine bleeding (AUB)  Provera 10 mg for 10 days.  Return to the office for TVS SIS possible biopsy  Orders:    medroxyPROGESTERone (PROVERA) 10 mg tablet; Take 1 tablet (10 mg total) by mouth daily for 10 days    Urinary incontinence, mixed  Referral placed to urogynecology           History of Present Illness   HPI  Josefa Nicole is a 54 y.o. female who presents complaining of urinary incontinence both mixed and stress incontinence.  She had seen [facility] was placed on Ditropan XL with no improvement.  She also presents complaining of abnormal uterine bleeding.  She is having frequent episodes of spotting over the past 7 months.  Denies any fever, dysuria, hematuria.  She experiences both stress and urgency incontinence and nocturnal enuresis.  No GI complaints.      Review of Systems       Objective   There were no vitals taken for this visit.     Physical Exam

## 2025-03-24 NOTE — PROGRESS NOTES
AMB US Pelvic Non OB    Date/Time: 3/26/2025 1:30 PM    Performed by: Patito Mi  Authorized by: Eddie Ngo DO  Universal Protocol:  Consent: Verbal consent obtained.  Consent given by: patient  Timeout called at: 3/26/2025 1:24 PM.  Patient understanding: patient states understanding of the procedure being performed  Patient identity confirmed: verbally with patient    Procedure details:     SIS Procedure: No    Indications: non-obstetric vaginal bleeding      Technique:  Transvaginal US, Non-OB    Position: lithotomy exam    Uterine findings:     Length (cm): 8.3    Height (cm):  4.91    Width (cm):  5.04    Endometrial stripe: identified      Endometrium thickness (mm):  3.91  Left ovary findings:     Left ovary:  Visualized    Length (cm): 2.66    Height (cm): 1.37    Width (cm): 1.39  Right ovary findings:     Right ovary:  Visualized    Length (cm): 1.97    Height (cm): 1.49    Width (cm): 1.67  Other findings:     Free pelvic fluid: not identified      Free peritoneal fluid: not identified    Post-Procedure Details:     Impression:  Anteverted uterus is inhomogeneous throughout without fibroids. The bilateral ovaries appear within normal limits. Nabothian cysts are present within the cervix. No free fluid.     Tolerance:  Tolerated well, no immediate complications    Complications: no complications    Additional Procedure Comments:      GE Voluson P8 transvaginal transducer RIC5-RA with Serial Number [id number] was used during procedure and subsequently cleaned with high level disinfection utilizing the [employer] EPR Probe .     Ultrasound performed at:     [facility]  [address] [place] [address]  Phone: [phone]  Fax:  [phone]          
Spontaneous, unlabored and symmetrical

## 2025-03-26 ENCOUNTER — ULTRASOUND (OUTPATIENT)
Dept: GYNECOLOGY | Facility: CLINIC | Age: 55
End: 2025-03-26
Payer: COMMERCIAL

## 2025-03-26 ENCOUNTER — OFFICE VISIT (OUTPATIENT)
Dept: GYNECOLOGY | Facility: CLINIC | Age: 55
End: 2025-03-26
Payer: COMMERCIAL

## 2025-03-26 DIAGNOSIS — N93.9 ABNORMAL UTERINE BLEEDING (AUB): Primary | ICD-10-CM

## 2025-03-26 DIAGNOSIS — N95.0 PMB (POSTMENOPAUSAL BLEEDING): Primary | ICD-10-CM

## 2025-03-26 PROCEDURE — 99213 OFFICE O/P EST LOW 20 MIN: CPT | Performed by: OBSTETRICS & GYNECOLOGY

## 2025-03-26 PROCEDURE — 76830 TRANSVAGINAL US NON-OB: CPT | Performed by: OBSTETRICS & GYNECOLOGY

## 2025-03-26 NOTE — PROGRESS NOTES
:  Assessment & Plan  PMB (postmenopausal bleeding)  Reviewed ultrasound findings with patient.  Endometrium less than 4 mm therefore no saline or biopsy was obtained.  Patient has been advised though that if she has should have episodes of bleeding closely every 21 days or should have an episode of bleeding lasting for more than 7 days or if she should go more than 4 months without a period then bleed again she needs to return to the office           History of Present Illness     Josefa Nicole is a 54 y.o. female   HPI  Patient was seen in the office on March 11 complaining of urinary incontinence both mixed and stress incontinence.  She had been referred to urogynecology.  [facility] had placed her on Ditropan XL with no improvement.  She also presented on that date stating she was experiencing abnormal uterine bleeding.  She was having frequent episodes of spotting over the past 7 months.  She was placed on Provera 10 mg for 10 days and advised to return to the office for TVS possible biopsy possible saline infusion      Cosign Needed         AMB US Pelvic Non OB     Date/Time: 3/26/2025 1:30 PM     Performed by: Patito Mi  Authorized by: Eddie Ngo DO  Universal Protocol:  Consent: Verbal consent obtained.  Consent given by: patient  Timeout called at: 3/26/2025 1:24 PM.  Patient understanding: patient states understanding of the procedure being performed  Patient identity confirmed: verbally with patient     Procedure details:     SIS Procedure: No    Indications: non-obstetric vaginal bleeding      Technique:  Transvaginal US, Non-OB    Position: lithotomy exam    Uterine findings:     Length (cm): 8.3    Height (cm):  4.91    Width (cm):  5.04    Endometrial stripe: identified      Endometrium thickness (mm):  3.91  Left ovary findings:     Left ovary:  Visualized    Length (cm): 2.66    Height (cm): 1.37    Width (cm): 1.39  Right ovary findings:     Right ovary:  Visualized    Length (cm):  1.97    Height (cm): 1.49    Width (cm): 1.67  Other findings:     Free pelvic fluid: not identified      Free peritoneal fluid: not identified    Post-Procedure Details:     Impression:  Anteverted uterus is inhomogeneous throughout without fibroids. The bilateral ovaries appear within normal limits. Nabothian cysts are present within the cervix. No free fluid.     Tolerance:  Tolerated well, no immediate complications    Complications: no complications                 Review of Systems  Objective   There were no vitals taken for this visit.     Physical Exam

## 2025-06-26 ENCOUNTER — NURSE TRIAGE (OUTPATIENT)
Age: 55
End: 2025-06-26

## 2025-06-26 NOTE — TELEPHONE ENCOUNTER
"REASON FOR CONVERSATION: Breast Pain    SYMPTOMS: left inner breast lump and pain, notes slight redness. Does not appear to be irritation, bug bit, pimple. States had temp a few days ago-resolved on its own.  Onset today    OTHER HEALTH INFORMATION: denies prior h/o breast health concerns    PROTOCOL DISPOSITION: No disposition on file.    CARE ADVICE PROVIDED: warm or cool compress whichever feels better. Tylenol/ibuprofen for pain    PRACTICE FOLLOW-UP: No availability per book it. Warm transfer to [facility] for further scheduling assistance.      Reason for Disposition   Breast lump    Answer Assessment - Initial Assessment Questions  1. SYMPTOM: \"What's the main symptom you're concerned about?\"  (e.g., lump, nipple discharge, pain, rash )      Painful Lump   2. LOCATION: \"Where is the lump located?\"      Left inner breast closer to nipple area  3. ONSET: \"When did lump  start?\"      today  4. PRIOR HISTORY: \"Do you have any history of prior problems with your breasts?\" (e.g., breast cancer, breast implant, fibrocystic breast disease)      denies  5. CAUSE: \"What do you think is causing this symptom?\"      unsure  6. OTHER SYMPTOMS: \"Do you have any other symptoms?\" (e.g., fever, breast pain, nipple discharge, redness or rash)      Intermittent mild pressure pain, more intense when touching   7. PREGNANCY-BREASTFEEDING: \"Is there any chance you are pregnant?\" \"When was your last menstrual period?\" \"Are you breastfeeding?\"      LMP- menses irregular.  Currently with light spotting on toilet tissue. Last full menses July 2024    Protocols used: Breast Symptoms-Adult-[place]    "

## 2025-06-26 NOTE — TELEPHONE ENCOUNTER
Regarding: lump in L breast with pain s/p fever  ----- Message from Renate AMES sent at 6/26/2025  2:56 PM EDT -----  Patient found a lump in her L breast with pain and has had a fever in the past. She is a pt of [facility]

## 2025-07-03 ENCOUNTER — OFFICE VISIT (OUTPATIENT)
Dept: GYNECOLOGY | Facility: CLINIC | Age: 55
End: 2025-07-03
Payer: COMMERCIAL

## 2025-07-03 VITALS
HEIGHT: 62 IN | BODY MASS INDEX: 41.04 KG/M2 | WEIGHT: 223 LBS | SYSTOLIC BLOOD PRESSURE: 110 MMHG | DIASTOLIC BLOOD PRESSURE: 78 MMHG

## 2025-07-03 DIAGNOSIS — N64.4 BREAST PAIN, LEFT: Primary | ICD-10-CM

## 2025-07-03 PROCEDURE — 99213 OFFICE O/P EST LOW 20 MIN: CPT | Performed by: OBSTETRICS & GYNECOLOGY

## 2025-07-03 NOTE — PROGRESS NOTES
":  Assessment & Plan  Breast pain, left    Orders:    Mammo diagnostic left w 3d and cad; Future    US breast left limited (diagnostic); Future  Decrease caffeine intake.  Start vitamin E 400 800 units daily      History of Present Illness     Josefa Nicole is a 54 y.o. female   HPI patient presents the office complaining of left breast discomfort over the past week.  This is in the upper outer quadrant of the left breast.  She denies any trauma.  Denies any breast discharge.  Her last mammogram was in October and was benign.  No family history of breast disease.  Review of Systems  Objective   /78   Ht 5' 2\" (1.575 m)   Wt 101 kg (223 lb)   BMI 40.79 kg/m²      Physical Exam  Vitals reviewed.   Chest:   Breasts:     Right: No swelling, bleeding, inverted nipple, mass, nipple discharge, skin change or tenderness.      Left: Tenderness present. No swelling, bleeding, inverted nipple, mass, nipple discharge or skin change.      Comments: Dense breast tissue bilaterally greater on the left than the right.  Tenderness in the upper outer quadrant of the left breast  Lymphadenopathy:      Upper Body:      Right upper body: No supraclavicular, axillary or pectoral adenopathy.      Left upper body: No supraclavicular, axillary or pectoral adenopathy.           "